# Patient Record
Sex: MALE | Race: WHITE | NOT HISPANIC OR LATINO | Employment: OTHER | URBAN - NONMETROPOLITAN AREA
[De-identification: names, ages, dates, MRNs, and addresses within clinical notes are randomized per-mention and may not be internally consistent; named-entity substitution may affect disease eponyms.]

---

## 2018-02-02 ENCOUNTER — OFFICE VISIT CONVERTED (OUTPATIENT)
Dept: FAMILY MEDICINE CLINIC | Age: 68
End: 2018-02-02
Attending: FAMILY MEDICINE

## 2018-08-01 ENCOUNTER — OFFICE VISIT CONVERTED (OUTPATIENT)
Dept: FAMILY MEDICINE CLINIC | Age: 68
End: 2018-08-01
Attending: FAMILY MEDICINE

## 2018-10-15 ENCOUNTER — OFFICE VISIT CONVERTED (OUTPATIENT)
Dept: FAMILY MEDICINE CLINIC | Age: 68
End: 2018-10-15
Attending: NURSE PRACTITIONER

## 2019-02-26 ENCOUNTER — OFFICE VISIT CONVERTED (OUTPATIENT)
Dept: FAMILY MEDICINE CLINIC | Age: 69
End: 2019-02-26
Attending: FAMILY MEDICINE

## 2019-02-26 ENCOUNTER — HOSPITAL ENCOUNTER (OUTPATIENT)
Dept: OTHER | Facility: HOSPITAL | Age: 69
Discharge: HOME | End: 2019-02-26
Attending: FAMILY MEDICINE

## 2019-02-26 LAB
ALBUMIN SERPL-MCNC: 4.2 G/DL (ref 3.5–5)
ALBUMIN/GLOB SERPL: 1.5 {RATIO} (ref 1.4–2.6)
ALP SERPL-CCNC: 65 U/L (ref 56–155)
ALT SERPL-CCNC: 26 U/L (ref 10–40)
ANION GAP SERPL CALC-SCNC: 20 MMOL/L (ref 8–19)
AST SERPL-CCNC: 17 U/L (ref 15–50)
BASOPHILS # BLD MANUAL: 0.05 10*3/UL (ref 0–0.2)
BASOPHILS NFR BLD MANUAL: 0.7 % (ref 0–3)
BILIRUB SERPL-MCNC: 0.3 MG/DL (ref 0.2–1.3)
BUN SERPL-MCNC: 18 MG/DL (ref 5–25)
BUN/CREAT SERPL: 16 {RATIO} (ref 6–20)
CALCIUM SERPL-MCNC: 8.7 MG/DL (ref 8.7–10.4)
CHLORIDE SERPL-SCNC: 105 MMOL/L (ref 99–111)
CHOLEST SERPL-MCNC: 183 MG/DL (ref 107–200)
CHOLEST/HDLC SERPL: 3.2 {RATIO} (ref 3–6)
CONV CO2: 25 MMOL/L (ref 22–32)
CONV TOTAL PROTEIN: 7 G/DL (ref 6.3–8.2)
CREAT UR-MCNC: 1.15 MG/DL (ref 0.7–1.2)
DEPRECATED RDW RBC AUTO: 49 FL
EOSINOPHIL # BLD MANUAL: 0.46 10*3/UL (ref 0–0.7)
EOSINOPHIL NFR BLD MANUAL: 6.4 % (ref 0–7)
ERYTHROCYTE [DISTWIDTH] IN BLOOD BY AUTOMATED COUNT: 13.2 % (ref 11.5–14.5)
GFR SERPLBLD BASED ON 1.73 SQ M-ARVRAT: >60 ML/MIN/{1.73_M2}
GLOBULIN UR ELPH-MCNC: 2.8 G/DL (ref 2–3.5)
GLUCOSE SERPL-MCNC: 115 MG/DL (ref 70–99)
GRANS (ABSOLUTE): 4.02 10*3/UL (ref 2–8)
GRANS: 56.3 % (ref 30–85)
HBA1C MFR BLD: 12.6 G/DL (ref 14–18)
HCT VFR BLD AUTO: 39.2 % (ref 42–52)
HDLC SERPL-MCNC: 57 MG/DL (ref 40–60)
IMM GRANULOCYTES # BLD: 0.03 10*3/UL (ref 0–0.54)
IMM GRANULOCYTES NFR BLD: 0.4 % (ref 0–0.43)
LDLC SERPL CALC-MCNC: 110 MG/DL (ref 70–100)
LYMPHOCYTES # BLD MANUAL: 1.89 10*3/UL (ref 1–5)
LYMPHOCYTES NFR BLD MANUAL: 9.8 % (ref 3–10)
MCH RBC QN AUTO: 32.3 PG (ref 27–31)
MCHC RBC AUTO-ENTMCNC: 32.1 G/DL (ref 33–37)
MCV RBC AUTO: 100.5 FL (ref 80–96)
MONOCYTES # BLD AUTO: 0.7 10*3/UL (ref 0.2–1.2)
OSMOLALITY SERPL CALC.SUM OF ELEC: 303 MOSM/KG (ref 273–304)
PLATELET # BLD AUTO: 226 10*3/UL (ref 130–400)
PMV BLD AUTO: 9.1 FL (ref 7.4–10.4)
POTASSIUM SERPL-SCNC: 5.3 MMOL/L (ref 3.5–5.3)
PSA SERPL-MCNC: 0.6 NG/ML (ref 0–4)
RBC # BLD AUTO: 3.9 10*6/UL (ref 4.7–6.1)
SODIUM SERPL-SCNC: 145 MMOL/L (ref 135–147)
TRIGL SERPL-MCNC: 80 MG/DL (ref 40–150)
TSH SERPL-ACNC: 1.83 M[IU]/L (ref 0.27–4.2)
VARIANT LYMPHS NFR BLD MANUAL: 26.4 % (ref 20–45)
VLDLC SERPL-MCNC: 16 MG/DL (ref 5–37)
WBC # BLD AUTO: 7.15 10*3/UL (ref 4.8–10.8)

## 2019-08-08 ENCOUNTER — HOSPITAL ENCOUNTER (OUTPATIENT)
Dept: SURGERY | Facility: HOSPITAL | Age: 69
Setting detail: HOSPITAL OUTPATIENT SURGERY
Discharge: HOME | End: 2019-08-08
Attending: OPHTHALMOLOGY

## 2019-08-22 ENCOUNTER — HOSPITAL ENCOUNTER (OUTPATIENT)
Dept: SURGERY | Facility: HOSPITAL | Age: 69
Setting detail: HOSPITAL OUTPATIENT SURGERY
Discharge: HOME | End: 2019-08-22
Attending: OPHTHALMOLOGY

## 2019-09-04 ENCOUNTER — OFFICE VISIT CONVERTED (OUTPATIENT)
Dept: FAMILY MEDICINE CLINIC | Age: 69
End: 2019-09-04
Attending: FAMILY MEDICINE

## 2019-10-22 ENCOUNTER — OFFICE VISIT CONVERTED (OUTPATIENT)
Dept: FAMILY MEDICINE CLINIC | Age: 69
End: 2019-10-22
Attending: FAMILY MEDICINE

## 2019-12-23 ENCOUNTER — OFFICE VISIT CONVERTED (OUTPATIENT)
Dept: FAMILY MEDICINE CLINIC | Age: 69
End: 2019-12-23
Attending: FAMILY MEDICINE

## 2020-01-29 ENCOUNTER — OFFICE VISIT CONVERTED (OUTPATIENT)
Dept: FAMILY MEDICINE CLINIC | Age: 70
End: 2020-01-29
Attending: FAMILY MEDICINE

## 2020-03-09 ENCOUNTER — OFFICE VISIT CONVERTED (OUTPATIENT)
Dept: FAMILY MEDICINE CLINIC | Age: 70
End: 2020-03-09
Attending: FAMILY MEDICINE

## 2020-03-23 ENCOUNTER — OFFICE VISIT CONVERTED (OUTPATIENT)
Dept: FAMILY MEDICINE CLINIC | Age: 70
End: 2020-03-23
Attending: FAMILY MEDICINE

## 2020-04-22 ENCOUNTER — HOSPITAL ENCOUNTER (OUTPATIENT)
Dept: OTHER | Facility: HOSPITAL | Age: 70
Discharge: HOME | End: 2020-04-22

## 2020-04-22 LAB
25(OH)D3 SERPL-MCNC: 18.9 NG/ML (ref 30–100)
ALBUMIN SERPL-MCNC: 3.5 G/DL (ref 3.5–5)
ALBUMIN/GLOB SERPL: 1 {RATIO} (ref 1.4–2.6)
ALP SERPL-CCNC: 80 U/L (ref 56–155)
ALT SERPL-CCNC: 15 U/L (ref 10–40)
ANION GAP SERPL CALC-SCNC: 21 MMOL/L (ref 8–19)
AST SERPL-CCNC: 16 U/L (ref 15–50)
BASOPHILS # BLD AUTO: 0.04 10*3/UL (ref 0–0.2)
BASOPHILS NFR BLD AUTO: 0.3 % (ref 0–3)
BILIRUB SERPL-MCNC: 0.65 MG/DL (ref 0.2–1.3)
BUN SERPL-MCNC: 21 MG/DL (ref 5–25)
BUN/CREAT SERPL: 26 {RATIO} (ref 6–20)
CALCIUM SERPL-MCNC: 9.6 MG/DL (ref 8.7–10.4)
CHLORIDE SERPL-SCNC: 91 MMOL/L (ref 99–111)
CONV ABS IMM GRAN: 0.06 10*3/UL (ref 0–0.2)
CONV CO2: 23 MMOL/L (ref 22–32)
CONV IMMATURE GRAN: 0.5 % (ref 0–1.8)
CONV TOTAL PROTEIN: 6.9 G/DL (ref 6.3–8.2)
CREAT UR-MCNC: 0.82 MG/DL (ref 0.7–1.2)
DEPRECATED RDW RBC AUTO: 47 FL (ref 35.1–43.9)
EOSINOPHIL # BLD AUTO: 0.17 10*3/UL (ref 0–0.7)
EOSINOPHIL # BLD AUTO: 1.5 % (ref 0–7)
ERYTHROCYTE [DISTWIDTH] IN BLOOD BY AUTOMATED COUNT: 13.2 % (ref 11.6–14.4)
GFR SERPLBLD BASED ON 1.73 SQ M-ARVRAT: >60 ML/MIN/{1.73_M2}
GLOBULIN UR ELPH-MCNC: 3.4 G/DL (ref 2–3.5)
GLUCOSE SERPL-MCNC: 113 MG/DL (ref 70–99)
HCT VFR BLD AUTO: 35 % (ref 42–52)
HGB BLD-MCNC: 11.4 G/DL (ref 14–18)
LYMPHOCYTES # BLD AUTO: 1.36 10*3/UL (ref 1–5)
LYMPHOCYTES NFR BLD AUTO: 11.9 % (ref 20–45)
MCH RBC QN AUTO: 31.5 PG (ref 27–31)
MCHC RBC AUTO-ENTMCNC: 32.6 G/DL (ref 33–37)
MCV RBC AUTO: 96.7 FL (ref 80–96)
MONOCYTES # BLD AUTO: 1.83 10*3/UL (ref 0.2–1.2)
MONOCYTES NFR BLD AUTO: 16 % (ref 3–10)
NEUTROPHILS # BLD AUTO: 7.99 10*3/UL (ref 2–8)
NEUTROPHILS NFR BLD AUTO: 69.8 % (ref 30–85)
NRBC CBCN: 0 % (ref 0–0.7)
OSMOLALITY SERPL CALC.SUM OF ELEC: 276 MOSM/KG (ref 273–304)
PLATELET # BLD AUTO: 343 10*3/UL (ref 130–400)
PMV BLD AUTO: 10.4 FL (ref 9.4–12.4)
POTASSIUM SERPL-SCNC: 4.3 MMOL/L (ref 3.5–5.3)
RBC # BLD AUTO: 3.62 10*6/UL (ref 4.7–6.1)
SODIUM SERPL-SCNC: 131 MMOL/L (ref 135–147)
WBC # BLD AUTO: 11.45 10*3/UL (ref 4.8–10.8)

## 2020-04-27 ENCOUNTER — HOSPITAL ENCOUNTER (OUTPATIENT)
Dept: OTHER | Facility: HOSPITAL | Age: 70
Discharge: HOME | End: 2020-04-27

## 2020-05-04 ENCOUNTER — HOSPITAL ENCOUNTER (OUTPATIENT)
Dept: OTHER | Facility: HOSPITAL | Age: 70
Discharge: HOME | End: 2020-05-04

## 2020-05-05 ENCOUNTER — HOSPITAL ENCOUNTER (OUTPATIENT)
Dept: OTHER | Facility: HOSPITAL | Age: 70
Discharge: HOME | End: 2020-05-05

## 2020-05-05 LAB — BACTERIA UR CULT: NORMAL

## 2020-05-11 ENCOUNTER — HOSPITAL ENCOUNTER (OUTPATIENT)
Dept: OTHER | Facility: HOSPITAL | Age: 70
Discharge: HOME | End: 2020-05-11

## 2020-05-11 LAB
BASOPHILS # BLD AUTO: 0.08 10*3/UL (ref 0–0.2)
BASOPHILS NFR BLD AUTO: 1.1 % (ref 0–3)
CONV ABS IMM GRAN: 0.01 10*3/UL (ref 0–0.2)
CONV IMMATURE GRAN: 0.1 % (ref 0–1.8)
DEPRECATED RDW RBC AUTO: 46.4 FL (ref 35.1–43.9)
EOSINOPHIL # BLD AUTO: 0.49 10*3/UL (ref 0–0.7)
EOSINOPHIL # BLD AUTO: 6.4 % (ref 0–7)
ERYTHROCYTE [DISTWIDTH] IN BLOOD BY AUTOMATED COUNT: 13 % (ref 11.6–14.4)
HCT VFR BLD AUTO: 37.5 % (ref 42–52)
HGB BLD-MCNC: 11.9 G/DL (ref 14–18)
LYMPHOCYTES # BLD AUTO: 2.53 10*3/UL (ref 1–5)
LYMPHOCYTES NFR BLD AUTO: 33.3 % (ref 20–45)
MCH RBC QN AUTO: 30.6 PG (ref 27–31)
MCHC RBC AUTO-ENTMCNC: 31.7 G/DL (ref 33–37)
MCV RBC AUTO: 96.4 FL (ref 80–96)
MONOCYTES # BLD AUTO: 0.76 10*3/UL (ref 0.2–1.2)
MONOCYTES NFR BLD AUTO: 10 % (ref 3–10)
NEUTROPHILS # BLD AUTO: 3.73 10*3/UL (ref 2–8)
NEUTROPHILS NFR BLD AUTO: 49.1 % (ref 30–85)
NRBC CBCN: 0 % (ref 0–0.7)
PLATELET # BLD AUTO: 281 10*3/UL (ref 130–400)
PMV BLD AUTO: 10 FL (ref 9.4–12.4)
RBC # BLD AUTO: 3.89 10*6/UL (ref 4.7–6.1)
WBC # BLD AUTO: 7.6 10*3/UL (ref 4.8–10.8)

## 2020-05-26 ENCOUNTER — OFFICE VISIT CONVERTED (OUTPATIENT)
Dept: FAMILY MEDICINE CLINIC | Age: 70
End: 2020-05-26
Attending: FAMILY MEDICINE

## 2020-08-24 ENCOUNTER — OFFICE VISIT CONVERTED (OUTPATIENT)
Dept: FAMILY MEDICINE CLINIC | Age: 70
End: 2020-08-24
Attending: FAMILY MEDICINE

## 2020-11-06 ENCOUNTER — HOSPITAL ENCOUNTER (OUTPATIENT)
Dept: OTHER | Facility: HOSPITAL | Age: 70
Discharge: HOME | End: 2020-11-06
Attending: FAMILY MEDICINE

## 2020-11-08 LAB — SARS-COV-2 RNA SPEC QL NAA+PROBE: NOT DETECTED

## 2020-11-30 ENCOUNTER — HOSPITAL ENCOUNTER (OUTPATIENT)
Dept: OTHER | Facility: HOSPITAL | Age: 70
Discharge: HOME | End: 2020-11-30
Attending: FAMILY MEDICINE

## 2020-11-30 ENCOUNTER — OFFICE VISIT CONVERTED (OUTPATIENT)
Dept: FAMILY MEDICINE CLINIC | Age: 70
End: 2020-11-30
Attending: FAMILY MEDICINE

## 2020-11-30 LAB
ALBUMIN SERPL-MCNC: 4.5 G/DL (ref 3.5–5)
ALBUMIN/GLOB SERPL: 1.7 {RATIO} (ref 1.4–2.6)
ALP SERPL-CCNC: 84 U/L (ref 56–155)
ALT SERPL-CCNC: 37 U/L (ref 10–40)
ANION GAP SERPL CALC-SCNC: 19 MMOL/L (ref 8–19)
AST SERPL-CCNC: 27 U/L (ref 15–50)
BILIRUB SERPL-MCNC: 0.44 MG/DL (ref 0.2–1.3)
BUN SERPL-MCNC: 27 MG/DL (ref 5–25)
BUN/CREAT SERPL: 17 {RATIO} (ref 6–20)
CALCIUM SERPL-MCNC: 9.4 MG/DL (ref 8.7–10.4)
CHLORIDE SERPL-SCNC: 98 MMOL/L (ref 99–111)
CHOLEST SERPL-MCNC: 236 MG/DL (ref 107–200)
CHOLEST/HDLC SERPL: 3.4 {RATIO} (ref 3–6)
CONV CO2: 27 MMOL/L (ref 22–32)
CONV TOTAL PROTEIN: 7.2 G/DL (ref 6.3–8.2)
CREAT UR-MCNC: 1.6 MG/DL (ref 0.7–1.2)
ERYTHROCYTE [DISTWIDTH] IN BLOOD BY AUTOMATED COUNT: 13 % (ref 11.5–14.5)
GFR SERPLBLD BASED ON 1.73 SQ M-ARVRAT: 43 ML/MIN/{1.73_M2}
GLOBULIN UR ELPH-MCNC: 2.7 G/DL (ref 2–3.5)
GLUCOSE SERPL-MCNC: 116 MG/DL (ref 70–99)
HBA1C MFR BLD: 14.3 G/DL (ref 14–18)
HCT VFR BLD AUTO: 44.7 % (ref 42–52)
HDLC SERPL-MCNC: 69 MG/DL (ref 40–60)
LDLC SERPL CALC-MCNC: 138 MG/DL (ref 70–100)
MCH RBC QN AUTO: 32.4 PG (ref 27–31)
MCHC RBC AUTO-ENTMCNC: 32 G/DL (ref 33–37)
MCV RBC AUTO: 101.1 FL (ref 80–96)
OSMOLALITY SERPL CALC.SUM OF ELEC: 294 MOSM/KG (ref 273–304)
PLATELET # BLD AUTO: 239 10*3/UL (ref 130–400)
PMV BLD AUTO: 8.8 FL (ref 7.4–10.4)
POTASSIUM SERPL-SCNC: 5.4 MMOL/L (ref 3.5–5.3)
PSA SERPL-MCNC: 0.65 NG/ML (ref 0–4)
RBC # BLD AUTO: 4.42 10*6/UL (ref 4.7–6.1)
SODIUM SERPL-SCNC: 139 MMOL/L (ref 135–147)
TRIGL SERPL-MCNC: 143 MG/DL (ref 40–150)
TSH SERPL-ACNC: 2.01 M[IU]/L (ref 0.27–4.2)
VLDLC SERPL-MCNC: 29 MG/DL (ref 5–37)
WBC # BLD AUTO: 7.5 10*3/UL (ref 4.8–10.8)

## 2021-02-04 ENCOUNTER — OFFICE VISIT CONVERTED (OUTPATIENT)
Dept: FAMILY MEDICINE CLINIC | Age: 71
End: 2021-02-04
Attending: FAMILY MEDICINE

## 2021-02-04 ENCOUNTER — HOSPITAL ENCOUNTER (OUTPATIENT)
Dept: OTHER | Facility: HOSPITAL | Age: 71
Discharge: HOME | End: 2021-02-04
Attending: INTERNAL MEDICINE

## 2021-02-04 LAB
ALBUMIN SERPL-MCNC: 4.4 G/DL (ref 3.5–5)
ALBUMIN/GLOB SERPL: 1.5 {RATIO} (ref 1.4–2.6)
ALP SERPL-CCNC: 78 U/L (ref 56–155)
ALT SERPL-CCNC: 46 U/L (ref 10–40)
ANION GAP SERPL CALC-SCNC: 16 MMOL/L (ref 8–19)
AST SERPL-CCNC: 32 U/L (ref 15–50)
BILIRUB SERPL-MCNC: 0.49 MG/DL (ref 0.2–1.3)
BUN SERPL-MCNC: 19 MG/DL (ref 5–25)
BUN/CREAT SERPL: 17 {RATIO} (ref 6–20)
CALCIUM SERPL-MCNC: 9.6 MG/DL (ref 8.7–10.4)
CHLORIDE SERPL-SCNC: 94 MMOL/L (ref 99–111)
CHOLEST SERPL-MCNC: 189 MG/DL (ref 107–200)
CHOLEST/HDLC SERPL: 3 {RATIO} (ref 3–6)
CONV CO2: 30 MMOL/L (ref 22–32)
CONV TOTAL PROTEIN: 7.4 G/DL (ref 6.3–8.2)
CREAT UR-MCNC: 1.15 MG/DL (ref 0.7–1.2)
GFR SERPLBLD BASED ON 1.73 SQ M-ARVRAT: >60 ML/MIN/{1.73_M2}
GLOBULIN UR ELPH-MCNC: 3 G/DL (ref 2–3.5)
GLUCOSE SERPL-MCNC: 134 MG/DL (ref 70–99)
HDLC SERPL-MCNC: 62 MG/DL (ref 40–60)
LDLC SERPL CALC-MCNC: 110 MG/DL (ref 70–100)
OSMOLALITY SERPL CALC.SUM OF ELEC: 282 MOSM/KG (ref 273–304)
POTASSIUM SERPL-SCNC: 5.6 MMOL/L (ref 3.5–5.3)
SODIUM SERPL-SCNC: 134 MMOL/L (ref 135–147)
T4 FREE SERPL-MCNC: 1.4 NG/DL (ref 0.9–1.8)
TRIGL SERPL-MCNC: 85 MG/DL (ref 40–150)
TSH SERPL-ACNC: 1.1 M[IU]/L (ref 0.27–4.2)
VLDLC SERPL-MCNC: 17 MG/DL (ref 5–37)

## 2021-04-28 ENCOUNTER — HOSPITAL ENCOUNTER (OUTPATIENT)
Dept: OTHER | Facility: HOSPITAL | Age: 71
Discharge: HOME | End: 2021-04-28
Attending: FAMILY MEDICINE

## 2021-04-29 LAB
ANION GAP SERPL CALC-SCNC: 14 MMOL/L (ref 8–19)
BUN SERPL-MCNC: 25 MG/DL (ref 5–25)
BUN/CREAT SERPL: 18 {RATIO} (ref 6–20)
CALCIUM SERPL-MCNC: 9.2 MG/DL (ref 8.7–10.4)
CHLORIDE SERPL-SCNC: 94 MMOL/L (ref 99–111)
CONV CO2: 29 MMOL/L (ref 22–32)
CREAT UR-MCNC: 1.38 MG/DL (ref 0.7–1.2)
GFR SERPLBLD BASED ON 1.73 SQ M-ARVRAT: 52 ML/MIN/{1.73_M2}
GLUCOSE SERPL-MCNC: 109 MG/DL (ref 70–99)
OSMOLALITY SERPL CALC.SUM OF ELEC: 277 MOSM/KG (ref 273–304)
POTASSIUM SERPL-SCNC: 5.6 MMOL/L (ref 3.5–5.3)
SODIUM SERPL-SCNC: 131 MMOL/L (ref 135–147)

## 2021-05-18 NOTE — PROGRESS NOTES
Xavier Polanco PATRICIA  1950     Office/Outpatient Visit    Visit Date: Mon, Aug 24, 2020 01:44 pm    Provider: Esvin Gilliam MD (Assistant: Marti Saleh RN)    Location: [facility]        Electronically signed by Esvin Gililam MD on  08/24/2020 05:29:13 PM                             Subjective:        CC: Francisco is a 69 year old White male.  This is a follow-up visit.      MCW;         HPI:           Francisco is here for a Medicare wellness visit.  The required HRA questions are integrated within this visit note. Family medical history and individual medical/surgical history were reviewed and updated.  A current height, weight, BMI, blood pressure, and pulse were recorded in the vitals section of the note and have been reviewed. Patient's medications, including supplements, were recorded in the chart and reviewed.          Self-Assessment of Health: He rates his health as fair. He rates his confidence of being able to control/manage most of his health problems as very confident. His physical/emotional health has limited his social activites moderately.  A review of possible cognitive impairment was performed and the following was noted: Memory Impairment Screen is normal.  A review of functional ability, including bathing, dressing, walking, and urine/bowel continence as well as level of safety was performed and was found to be negative.  Falls Risk: Has not had any falls or only one fall without injury in the past year.  He denies having trouble hearing the TV/radio when others do not, having to strain to hear or understand conversations and wearing hearing aid(s).  Concerning home safety, he reports that at home he DOES have adequate lighting, a skid resistant shower/tub, grab bars in the bath, handrails on stairs, functioning smoke alarms and absence of throw rugs.          Immunization Status: ** >10 years since last Td booster; Age>60, no shingles vaccination; Physical  Activity: He exercises but less than 20 minutes 3 days per week; Type of diet patient normally eats is described as poor--needs improvement.  Tobacco: He has a past history of cigarette smoking; quit date:  Quit Date: 2017.   Preventative Health updated today.            PHQ-9 Depression Screening: Completed form scanned and in chart; Total Score enter total score  :23     ROS:     CONSTITUTIONAL:  Negative for chills and fever.      EYES:  Negative for blurred vision and eye drainage.      E/N/T:  Negative for ear pain, nasal congestion and sore throat.      CARDIOVASCULAR:  Negative for chest pain, palpitations and pedal edema.      RESPIRATORY:  Negative for recent cough and dyspnea.      GASTROINTESTINAL:  Negative for abdominal pain, anorexia, constipation, diarrhea, nausea and vomiting.      GENITOURINARY:  Positive for nocturia (TWICE A NIGHT).   Negative for dysuria, hematuria, urinary incontinence or change in urine stream.      NEUROLOGICAL:  Negative for dizziness and headaches.      PSYCHIATRIC:  Positive for depression ( (STATES MEDS ARE WORKING WELL) ).   Negative for suicidal thoughts.          Past Medical History / Family History / Social History:         Last Reviewed on 2020 01:56 PM by Esvin Gilliam    Past Medical History:             PREVENTIVE HEALTH MAINTENANCE             EVALUATION FOR AORTIC ANEURYSM: was last done  with normal results     COLORECTAL CANCER SCREENING: Up to date (colonoscopy q10y; sigmoidoscopy q5y; Cologuard q3y) was last done , Results are in chart; colonoscopy with normal results;      DENTAL CLEANIN19     EYE EXAM: 19     Hepatitis C Medicare Screening: was last done      PSA: was last done 2019 with normal results         PAST MEDICAL HISTORY             CURRENT MEDICAL PROVIDERS:    Cardiologist    Pulmonologist             ADVANCE DIRECTIVES: Living will         Surgical History:         Arthroscopy: left shoulder;      Cataract Removal: bilateral;     Fracture Repair: L/R ANKLE. PELVIS, L CLAVICLE, L HUMERUS;;;     Laminectomy: cervical region;         Family History:     Father: Cause of death was cancer     Mother:      Brother(s): 5 brother(s) total; 1     ; Positive for Skin Cancer ( Melanoma );     Sister(s): 4 sister(s) total     Son(s): 1 son(s) total     Daughter(s): 5 daughter(s) total         Social History:     Occupation:    Retired         Tobacco/Alcohol/Supplements:     Last Reviewed on 2020 01:56 PM by Esvin Gilliam    Tobacco: He has a past history of cigar smoking. Quit Date:  17         Alcohol: Frequency: Daily When he drinks, the average quantity of alcohol is 3 drinks.   He typically consumes beer.      Caffeine:  He admits to consuming caffeine via coffee ( 1 serving per day ).          Substance Abuse History:     Last Reviewed on 2020 01:56 PM by Esvin Gilliam    NEGATIVE         Mental Health History:     Last Reviewed on 2020 07:54 PM by Panda Nicole        Communicable Diseases (eg STDs):     Last Reviewed on 2020 07:54 PM by Panda Nicole        Current Problems:     Last Reviewed on 2020 01:56 PM by Esvin Gilliam    Pure hypercholesterolemia (mild)    Primary generalized (osteo)arthritis    Anxiety disorder, unspecified    Other long term (current) drug therapy    Diverticulosis    Allergies    Obstructive sleep apnea (cannot tolerate CPAP)    Paroxysmal atrial fibrillation    Unspecified asthma, uncomplicated    Encounter for general adult medical examination without abnormal findings    Encounter for screening for depression        Immunizations:     Prevnar 13 (Pneumococcal PCV 13) 2018    Fluzone Quadrivalent (3+ years) 10/21/2016    Fluzone High-Dose pf (>=65 yr) 10/31/2017    Fluzone High-Dose pf (>=65 yr) 2018    Fluzone High-Dose pf (>=65 yr) 10/7/2019    PNEUMOVAX 23 (Pneumococcal PPV23) 2019         Allergies:     Last Reviewed on 8/24/2020 01:56 PM by Esvin Gilliam    No Known Allergies.        Current Medications:     Last Reviewed on 8/24/2020 01:56 PM by Esvin Gilliam    aspirin 81 mg oral tablet, delayed release (enteric coated) [take 1 tablet (81 mg) by oral route once daily]    multivitamin     Tramadol 50 mg oral tablet [1 tab bid. prn]    busPIRone 10 mg oral tablet [TAKE ONE TABLET BY MOUTH TWICE A DAY AS NEEDED]    Diclofenac Sodium 75 mg oral tablet, delayed release (enteric coated) [1 tab bid]    amiodarone 200 mg oral tablet [1 tab daily ]    PARoxetine HCl 10 mg oral tablet [Take 1 tablet(s) by mouth daily]    cyclobenzaprine 10 mg oral tablet [take 1 tablet (10 mg) by oral route 3 times per day as needed]        Objective:        Vitals:         Current: 8/24/2020 1:45:59 PM    Ht:  5 ft, 5 in;  Wt: 221.6 lbs;  BMI: 36.9T: 98 F (oral);  BP: 111/37 mm Hg (left arm, sitting);  P: 62 bpm (left arm (BP Cuff), sitting);  sCr: 1.15 mg/dL;  GFR: 61.90VA: 20/50 OD, 20/50 OS (near, without correction)        Repeat:     2:0:30 PM  BP:   127/62mm Hg (right arm, lying right) 2:0:41 PM  P:   59bpm (left arm (BP Cuff), sitting)     Exams:     PHYSICAL EXAM:     GENERAL: Vitals recorded well developed, well nourished;  no apparent distress;     EYES: conjunctiva and cornea are normal;     E/N/T:  normal EACs, TMs, nasal/oral mucosa, teeth, gingiva, and oropharynx;     NECK: trachea is midline; thyroid is non-palpable; carotid exam reveals no bruits;     RESPIRATORY: normal respiratory rate and pattern with no distress; normal breath sounds with no rales, rhonchi, wheezes or rubs;     CARDIOVASCULAR: normal rate; rhythm is regular;  no systolic murmur;    Peripheral Pulses: dorsalis pedis: 2+ L and 2+ R;  no edema;     GASTROINTESTINAL: nontender;     LYMPHATIC: no enlargement of cervical or facial nodes;     MUSCULOSKELETAL: normal gait; normal overall tone     NEUROLOGIC: GROSSLT INTACT;      PSYCHIATRIC:  appropriate affect and demeanor; normal speech pattern; grossly normal memory;         Lab/Test Results:         Urine temperature: confirmed (08/24/2020),     All urine drug screen levels confirmed negative: yes (08/24/2020),     Date and time of last pill: tramadol 8/24/20 10 am/and (08/24/2020),     Performed by: tls (08/24/2020),     Collection Time: 1455 (08/24/2020),             Assessment:         Z00.00   Encounter for general adult medical examination without abnormal findings       Z79.899   Other long term (current) drug therapy       Z13.31   Encounter for screening for depression           ORDERS:         Radiology/Test Orders:       3017F  Colorectal CA screen results documented and reviewed (PV)  (In-House)              Lab Orders:       83240  Drug test prsmv qual dir optical obs per day  (In-House)              Procedures Ordered:         Annual wellness visit, includes a PPPS, subsequent visit  (In-House)              Other Orders:         Depression screen positive and follow up plan documented  (In-House)            1101F  Pt screen for fall risk; document no falls in past year or only 1 fall w/o injury in past year (LOGAN)  (In-House)                      Plan:         Encounter for general adult medical examination without abnormal findings        COUNSELING was provided today regarding the following topics: healthy eating habits, regular exercise, use of seat belts, fall prevention, Medicare Preventive Service Guide given to patient., ADVISED TO SEE AN EYE DOCTOR AND A DENTIST REGULARLY, and Given Home Safety Handout.      FOLLOW-UP: Schedule a follow-up visit in 3 months.      RECOMMENDATIONS given include: need for yearly flu shots.  MIPS Positive Depression Screen: Suicide Risk Assessment completed--denies suicidal/homicidal ideation; Stable on medications. No suicidal ideation.            Orders:         Depression screen positive and follow up plan  documented  (In-House)            1101F  Pt screen for fall risk; document no falls in past year or only 1 fall w/o injury in past year (LOGAN)  (In-House)            3017F  Colorectal CA screen results documented and reviewed (PV)  (In-House)              Annual wellness visit, includes a PPPS, subsequent visit  (In-House)              Other long term (current) drug therapy    LABORATORY:  Labs ordered to be performed today include Drug screen.            Orders:       [id number]  Drug test prsmv qual dir optical obs per day  (In-House)                  Patient Recommendations:        For  Encounter for general adult medical examination without abnormal findings:    Limit dietary intake of fat (especially saturated fat) and cholesterol.  Eat a variety of foods, including plenty of fruits, vegetables, and grain containg fiber, limit fat intake to 30% of total calories. Balance caloric intake with energy expended. Maintaining regular physical activity is advised to help prevent heart disease, hypertension, diabetes, and obesity. Always use shoulder/lap restraints when driving or riding in a vehicle, even those equipped with air bags. Regularly exercise within recommended guidelines, especially to maintain balance. Remove obstacles in walkways at home.  Use non-skid material for bathtub safety.  Schedule a follow-up visit in 3 months.  Obtain a flu shot each year.              Charge Capture:         Primary Diagnosis:     Z00.00  Encounter for general adult medical examination without abnormal findings           Orders:      [id number]  Preventive medicine, established patient, age 65+ years  (In-House)              Depression screen positive and follow up plan documented  (In-House)            1101F  Pt screen for fall risk; document no falls in past year or only 1 fall w/o injury in past year (LOGAN)  (In-House)            3017F  Colorectal CA screen results documented and reviewed (PV)  (In-House)               Annual wellness visit, includes a PPPS, subsequent visit  (In-House)              Z79.899  Other long term (current) drug therapy           Orders:      32011  Drug test prsmv qual dir optical obs per day  (In-House)              Z13.31  Encounter for screening for depression

## 2021-05-18 NOTE — PROGRESS NOTES
"Xavier Polanco PATRICIA  1950     Office/Outpatient Visit    Visit Date: Wed, Jan 29, 2020 01:20 pm    Provider: Panda Nicole MD (Assistant: Selena Gusman MA)    Location: [facility]        Electronically signed by Panda Nicole MD on  01/29/2020 07:54:28 PM                             Subjective:        CC: Francisco is a 69 year old White male.  presents today due to stiff hands         HPI: Patient c/o an episode approx 1 week ago where he experienced bilateral numb hands, weak legs, and lightheadedness. He denies confusion or syncope during the episode. It lasted for \"a couple minutes.\" He had been attempting to blow into his car's breathalyzer, which he struggles with at baseline, and after he was unsuccessful, he stood up and that's when the symptoms appeared.          PHQ-9 Depression Screening: Completed form scanned and in chart; Total Score 0     ROS:     CONSTITUTIONAL:  Negative for chills, fatigue and fever.      EYES:  Negative for blurred vision.      CARDIOVASCULAR:  Positive for lightheadedness.   Negative for chest pain, palpitations or edema.      RESPIRATORY:  Negative for dyspnea and cough.      GASTROINTESTINAL:  Negative for abdominal pain, constipation, diarrhea, heartburn, nausea and vomiting.      NEUROLOGICAL:  Positive for paresthesias and weakness.   Negative for headaches.      PSYCHIATRIC:  Negative for anxiety, depression, feelings of stress and suicidal thoughts.          Past Medical History / Family History / Social History:         Last Reviewed on 1/29/2020 07:54 PM by Panda Nicole    Past Medical History:             PREVENTIVE HEALTH MAINTENANCE             EVALUATION FOR AORTIC ANEURYSM: was last done 2018 with normal results     COLORECTAL CANCER SCREENING: Up to date (colonoscopy q10y; sigmoidoscopy q5y; Cologuard q3y) was last done 2015, Results are in chart; colonoscopy with normal results     DENTAL CLEANING: RECOMMENDED 2/26/19     EYE EXAM: " RECOMMENDED 19     Hepatitis C Medicare Screening: was last done      PSA: was last done  with normal results         PAST MEDICAL HISTORY             CURRENT MEDICAL PROVIDERS:    Cardiologist    Pulmonologist             ADVANCE DIRECTIVES: Living will         Surgical History:         Arthroscopy: left shoulder;     Cataract Removal: bilateral;     Fracture Repair: L/R ANKLE. PELVIS, L CLAVICLE, L HUMERUS;;;         Family History:     Father: Cause of death was cancer     Mother:      Brother(s): 5 brother(s) total; 1     ; Positive for Skin Cancer ( Melanoma );     Sister(s): 4 sister(s) total     Son(s): 1 son(s) total     Daughter(s): 5 daughter(s) total         Social History:     Occupation:    Retired         Tobacco/Alcohol/Supplements:     Last Reviewed on 2020 07:54 PM by Panda Nicole    Tobacco: He has a past history of cigar smoking. Quit Date:  17         Alcohol: Frequency: Daily When he drinks, the average quantity of alcohol is 3 drinks.   He typically consumes beer.      Caffeine:  He admits to consuming caffeine via coffee ( 2 servings per day ).          Substance Abuse History:     Last Reviewed on 2020 07:54 PM by Panda Nicole    NEGATIVE         Mental Health History:     Last Reviewed on 2020 07:54 PM by Panda Nicole        Communicable Diseases (eg STDs):     Last Reviewed on 2020 07:54 PM by Panda Nciole        Current Problems:     Last Reviewed on 2020 07:54 PM by Panda Nicole    Generalized osteoarthritis, multiple sites    Pure hypercholesterolemia    Primary generalized (osteo)arthritis    Elevated cholesterol    Anxiety    Anxiety disorder, unspecified    Other long term (current) drug therapy    Use of high risk medications    Diverticulosis    Allergies    HTN    Essential (primary) hypertension    Paroxysmal atrial fibrillation    Obstructive sleep apnea (adult) (pediatric)    Obstructive sleep apnea (CPAP pending)    " Unspecified asthma, uncomplicated    Encounter for screening for depression        Immunizations:     Prevnar 13 (Pneumococcal PCV 13) 2/2/2018    Fluzone Quadrivalent (3+ years) 10/21/2016    Fluzone High-Dose pf (>=65 yr) 10/31/2017    Fluzone High-Dose pf (>=65 yr) 11/14/2018    Fluzone High-Dose pf (>=65 yr) 10/7/2019    PNEUMOVAX 23 (Pneumococcal PPV23) 2/26/2019        Allergies:     Last Reviewed on 1/29/2020 07:54 PM by Panda Nicole    No Known Allergies.        Current Medications:     Last Reviewed on 1/29/2020 07:54 PM by Panda Nicole    busPIRone 10 mg oral tablet [Take 1 tablet by mouth twice daily as needed]    Diclofenac Sodium 75 mg oral tablet, delayed release (enteric coated) [1 tab bid]    amiodarone 200 mg oral tablet [1 tab daily ]    Valsartan 80 mg oral tablet [Take 1 tablet(s) by mouth daily]    Paroxetine HCl 10 mg oral tablet [Take 1 tablet(s) by mouth daily]    Ventolin HFA 90 mcg/actuation Inhalation HFA Aerosol Inhaler [inhale 1 - 2 puffs (90 - 180 mcg) by inhalation route every 4 hours as needed]    aspirin 81 mg oral tablet, delayed release (enteric coated) [take 1 tablet (81 mg) by oral route once daily]    Tramadol 50 mg oral tablet [1 tab bid. prn]        Objective:        Vitals:         Current: 1/29/2020 1:27:27 PM    Ht:  5 ft, 5 in;  Wt: 236.6 lbs;  BMI: 39.4T: 98.5 F (oral);  BP: 144/70 mm Hg (left arm, sitting);  P: 57 bpm (left arm (BP Cuff), sitting);  sCr: 1.15 mg/dL;  GFR: 63.65        Exams:     PHYSICAL EXAM:     GENERAL: Vitals recorded well developed, well nourished;  no apparent distress;     EYES: conjunctiva and cornea are normal;     RESPIRATORY: Clear to auscultation bilateally; no rales (\"crackles\") present; no rhonchi; no wheezes;     CARDIOVASCULAR: normal rate; rhythm is regular;  No murmurs, clicks, gallops or rubs appreciated; no edema;     SKIN:  No significant rashes, lesions or suspicious moles within limits of examination;     NEUROLOGIC: Grossly " intact; mental status: alert and oriented x 3; cranial nerves II-XII grossly intact; sensation: intact to light touch in all 4 extremities;  reflexes: knee jerks: 2+;     PSYCHIATRIC: appropriate affect and demeanor; normal speech pattern; Normal behavior;         Assessment:         R42   Dizziness and giddiness       R20.2   Paresthesia of skin       Z13.31   Encounter for screening for depression           ORDERS:         Other Orders:         Depression screen negative  (In-House)                      Plan:         Dizziness and giddiness- Unclear etiology. However, working diagnosis is that patient symptoms were brought on by hyperventilation due to repeated attempts to blow into his cars breathalyzer.  He is concerned about TIA or undiagnosed stroke and while we cannot definitively ruled out at this time, It is not felt that we need to do any work-up for this.  If symptoms recur or worsen, will consider further work-up including imaging. ED/return precautions given.        Paresthesia of skin- See above        Encounter for screening for depression    MIPS PHQ-9 Depression Screening: Completed form scanned and in chart; Total Score 0; Negative Depression Screen           Orders:         Depression screen negative  (In-House)                  Charge Capture:         Primary Diagnosis:     R42  Dizziness and giddiness           Orders:      [id number]  Office/outpatient visit; established patient, level 4  (In-House)              R20.2  Paresthesia of skin     Z13.31  Encounter for screening for depression           Orders:        Depression screen negative  (In-House)

## 2021-05-18 NOTE — PROGRESS NOTES
Xavier Polanco 1950     Office/Outpatient Visit    Visit Date: Wed, Sep 4, 2019 03:00 pm    Provider: Esvin Gilliam MD (Assistant: Aide Bradford MA)    Location: [facility]        Electronically signed by Esvin Gilliam MD on  09/05/2019 08:13:50 AM                             SUBJECTIVE:        CC:     Francisco is a 68 year old White male.  Patient presents today for follow up visit         HPI:         Francisco presents with generalized osteoarthritis, multiple sites.  This has been diagnosed as osteoarthritis.  The discomfort is moderately severe.  Primary joints affected include left shoulder, knees, and ankles.  These medications have worked the best: muscle relaxants, NSAIDS ( Voltaren ), and pain medications ( TRAMADOL ) Other details: ALSO STILL HAS PAIN FROM OLD BROKEN RIBS.          Elevated cholesterol details; current treatment includes diet.  Compliance with treatment has been fair.  Most recent lab tests include Total Cholesterol:  183 (mg/dL) (02/26/2019), HDL:  57 (mg/dL) (02/26/2019), Triglycerides:  80 (mg/dL) (02/26/2019), LDL:  110 (mg/dL) (02/26/2019).          HTN details; his current cardiac medication regimen includes an angiotensin receptor blocker.  Compliance with treatment has been good.      ROS:     CONSTITUTIONAL:  Negative for chills and fever.      E/N/T:  Negative for ear pain, nasal congestion and sore throat.      CARDIOVASCULAR:  Negative for chest pain, palpitations and pedal edema.      RESPIRATORY:  Negative for recent cough and dyspnea.      GASTROINTESTINAL:  Negative for abdominal pain, anorexia, nausea and vomiting.      NEUROLOGICAL:  Negative for dizziness and headaches.          PMH/FM/SH:     Last Reviewed on 9/05/2019 08:06 AM by Esvin Gilliam    Past Medical History:             PREVENTIVE HEALTH MAINTENANCE             EVALUATION FOR AORTIC ANEURYSM: was last done 2018 with normal results     COLORECTAL CANCER SCREENING: Up to date  (colonoscopy q10y; sigmoidoscopy q5y; Cologuard q3y) was last done , Results are in chart; colonoscopy with normal results     DENTAL CLEANING: RECOMMENDED 19     EYE EXAM: RECOMMENDED 19     Hepatitis C Medicare Screening: was last done      PSA: was last done  with normal results         PAST MEDICAL HISTORY             CURRENT MEDICAL PROVIDERS:    Cardiologist    Pulmonologist             ADVANCE DIRECTIVES: Living will         Surgical History:         Arthroscopy: left shoulder;     Cataract Removal: bilateral;     Fracture Repair: L/R ANKLE. PELVIS, L CLAVICLE, L HUMERUS;;;         Family History:     Father: Cause of death was cancer     Mother:      Brother(s): 5 brother(s) total; 1     ; Positive for Skin Cancer ( Melanoma );     Sister(s): 4 sister(s) total     Son(s): 1 son(s) total     Daughter(s): 5 daughter(s) total         Social History:     Occupation:    Retired         Tobacco/Alcohol/Supplements:     Last Reviewed on 2019 07:57 AM by Esvin Gilliam    Tobacco: He has a past history of cigar smoking. Quit Date:  17         Alcohol: Frequency: Daily When he drinks, the average quantity of alcohol is 3 drinks.   He typically consumes beer.      Caffeine:  He admits to consuming caffeine via coffee ( 2 servings per day ).          Substance Abuse History:     Last Reviewed on 2019 07:57 AM by Esvin Gilliam    NEGATIVE         Mental Health History:     Last Reviewed on 2019 10:44 AM by Paula Avalos        Communicable Diseases (eg STDs):     Last Reviewed on 2019 10:44 AM by Paula Avalos            Current Problems:     Last Reviewed on 2019 08:06 AM by Esvin Gilliam    Obstructive sleep apnea (CPAP pending)     Paroxysmal atrial fibrillation     HTN     Allergies     Diverticulosis     Use of high risk medications     Anxiety     Generalized osteoarthritis, multiple sites     Elevated  cholesterol         Immunizations:     Prevnar 13 (Pneumococcal PCV 13) 2/2/2018     Fluzone Quadrivalent (3+ years) 10/21/2016     Fluzone High-Dose pf (>=65 yr) 10/31/2017     Fluzone High-Dose pf (>=65 yr) 11/14/2018     PNEUMOVAX 23 (Pneumococcal PPV23) 2/26/2019         Allergies:     Last Reviewed on 9/05/2019 07:57 AM by Esvin Gilliam      No Known Drug Allergies.         Current Medications:     Last Reviewed on 9/05/2019 07:59 AM by Esvin Gilliam    Buspirone HCl 10mg Tablet Take 1 tablet by mouth twice daily as needed     Paroxetine HCl 10mg Tablet Take 1 tablet(s) by mouth daily     Tramadol 50mg Tablet 1 tab bid. prn     Diclofenac Sodium 75mg Tablets, Enteric Coated 1 tab bid     Valsartan 80mg Tablet Take 1 tablet(s) by mouth daily     Amiodarone HCl 200mg Tablet 1 tab daily         OBJECTIVE:        Vitals:         Current: 9/4/2019 3:05:29 PM    Ht:  5 ft, 5 in;  Wt: 233.6 lbs;  BMI: 38.9    T: 98 F (oral);  BP: 137/65 mm Hg (left arm, sitting);  P: 54 bpm (left arm (BP Cuff), sitting);  sCr: 1.15 mg/dL;  GFR: 64.15        Exams:     PHYSICAL EXAM:     GENERAL: Vitals recorded well developed, well nourished;  no apparent distress;     NECK: trachea is midline; thyroid is non-palpable;     RESPIRATORY: normal respiratory rate and pattern with no distress; normal breath sounds with no rales, rhonchi, wheezes or rubs;     CARDIOVASCULAR: normal rate; rhythm is regular;  no systolic murmur; no edema;     GASTROINTESTINAL: nontender;     LYMPHATIC: no enlargement of cervical or facial nodes;     NEUROLOGIC: GROSSLT INTACT;     PSYCHIATRIC:  appropriate affect and demeanor; normal speech pattern; grossly normal memory;         ASSESSMENT           715.09   M15.0  Generalized osteoarthritis, multiple sites              DDx:     272.0   E78.0  Elevated cholesterol              DDx:     401.1   I10  HTN              DDx:         ORDERS:         Meds Prescribed:       Refill of: Tramadol  50mg Tablet 1 tab bid. prn  #60 (Sixty) tablet(s) Refills: 2       Refill of: Diclofenac Sodium 75mg Tablets, Enteric Coated 1 tab bid  #60 (Sixty) tablet(s) Refills: 5         Lab Orders:       APPTO  Appointment need  (In-House)                   PLAN:          Generalized osteoarthritis, multiple sites         RECOMMENDATIONS given include: need for yearly flu shots.      FOLLOW-UP: Schedule a follow-up visit in 6 months..   for Medicare Wellness Visit Controlled substance documentation: Jeremiah reviewed; prior drug screen consistent; consent is reviewed and signed and on the chart.  He is aware of risk of addiction on this medication, understands that he will need to follow up for a review every 3 months and his medications will be adjusted or decreased as deemed appropriate at each visit.  No history of drug or alcohol abuse.  No concerns about diversion or abuse. He denies side effects related to the medication.  He is aware that he may be called in for pill counts.  The dosing of this medication will be reviewed on a regular basis and reduced if possible..  Ongoing use of a controlled substance is necessary for this patient to have a normal quality of life           Prescriptions:       Refill of: Tramadol 50mg Tablet 1 tab bid. prn  #60 (Sixty) tablet(s) Refills: 2       Refill of: Diclofenac Sodium 75mg Tablets, Enteric Coated 1 tab bid  #60 (Sixty) tablet(s) Refills: 5           Orders:       APPTO  Appointment need  (In-House)            Elevated cholesterol         RECOMMENDATIONS given include: low cholesterol/low fat diet.           HTN         MEDICATIONS: (no change to current medication regimen)             Patient Recommendations:        For  Generalized osteoarthritis, multiple sites:     Obtain a flu shot each year.  Schedule a follow-up visit in 6 months.                APPOINTMENT INFORMATION:        Monday Tuesday Wednesday Thursday Friday Saturday Sunday             Time:___________________AM  PM   Date:_____________________         For  Elevated cholesterol:     Reduce the amount of cholesterol and saturated fat in your diet.              CHARGE CAPTURE           **Please note: ICD descriptions below are intended for billing purposes only and may not represent clinical diagnoses**        Primary Diagnosis:         715.09 Generalized osteoarthritis, multiple sites            M15.0    Primary generalized (osteo)arthritis              Orders:          35836   Office/outpatient visit; established patient, level 4  (In-House)             APPTO   Appointment need  (In-House)           272.0 Elevated cholesterol            E78.0    Pure hypercholesterolemia    401.1 HTN            I10    Essential (primary) hypertension
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
## 2021-05-18 NOTE — PROGRESS NOTES
Xavier Polanco  1950     Office/Outpatient Visit    Visit Date: Thu, Feb 4, 2021 09:24 am    Provider: Esvin Gilliam MD (Assistant: Ariel Alexis MA)    Location: [facility]        Electronically signed by Esvin Gilliam MD on  02/04/2021 11:53:09 AM                             Subjective:        CC: Francisco is a 70 year old White male.  This is a follow-up visit.  C/O of low back pain         HPI:           Francisco presents with pure hypercholesterolemia (mild).  Current treatment includes diet.  Compliance with treatment has been fair.  Most recent lab tests include Total Cholesterol:  236 (mg/dL) (11/30/2020), HDL:  69 (mg/dL) (11/30/2020), Triglycerides:  143 (mg/dL) (11/30/2020), LDL:  138 (mg/dL) (11/30/2020).            Essential (primary) hypertension details; this was first diagnosed several years ago.  His current cardiac medication regimen includes an angiotensin receptor blocker.  Compliance with treatment has been good.  He is tolerating the medication well without side effects.  He has not kept a blood pressure diary, but states that pressures have been too high.  ACEI MADE HIM COUGH       (Worsening)     Concerning primary generalized (osteo)arthritis, this has been diagnosed as osteoarthritis.  The discomfort is moderately severe.  Primary joints affected include left shoulder, knees, and ankles.  These medications have worked the best: muscle relaxants, NSAIDS ( Voltaren ), and pain medications ( TRAMADOL ) Other details: ALSO STILL HAS PAIN FROM OLD BROKEN RIBS.  FEELS WORSE SINCE BEING OFF THE NSAIDS.      ROS:     CONSTITUTIONAL:  Negative for chills and fever.      E/N/T:  Negative for ear pain, nasal congestion and sore throat.      CARDIOVASCULAR:  Negative for chest pain, palpitations and pedal edema.      RESPIRATORY:  Negative for recent cough and dyspnea.      GASTROINTESTINAL:  Negative for abdominal pain, anorexia, constipation, diarrhea, nausea and  vomiting.      NEUROLOGICAL:  Negative for dizziness, headaches and RECENT FALLING.          Past Medical History / Family History / Social History:         Last Reviewed on 2021 09:44 AM by Esvin Gilliam    Past Medical History:             PREVENTIVE HEALTH MAINTENANCE             EVALUATION FOR AORTIC ANEURYSM: was last done  with normal results     COLORECTAL CANCER SCREENING: Up to date (colonoscopy q10y; sigmoidoscopy q5y; Cologuard q3y) was last done , Results are in chart; colonoscopy with normal results;      DENTAL CLEANIN19     EYE EXAM: 19     Hepatitis C Medicare Screening: was last done      PSA: was last done 2020 with normal results         PAST MEDICAL HISTORY             CURRENT MEDICAL PROVIDERS:    Cardiologist    Pulmonologist             ADVANCE DIRECTIVES: Living will         Surgical History:         Arthroscopy: left shoulder;     Cataract Removal: bilateral;     Fracture Repair: L/R ANKLE. PELVIS, L CLAVICLE, L HUMERUS;;;     Laminectomy: cervical region;         Family History:     Father: Cause of death was cancer     Mother:      Brother(s): 5 brother(s) total; 1     ; Positive for Skin Cancer ( Melanoma );     Sister(s): 4 sister(s) total     Son(s): 1 son(s) total     Daughter(s): 5 daughter(s) total         Social History:     Occupation:    Retired         Tobacco/Alcohol/Supplements:     Last Reviewed on 2021 09:44 AM by Esvin Gilliam    Tobacco: He has a past history of cigar smoking. Quit Date:  17         Alcohol: Frequency: Daily When he drinks, the average quantity of alcohol is 3 drinks.   He typically consumes beer.      Caffeine:  He admits to consuming caffeine via coffee ( 1 serving per day ).          Substance Abuse History:     Last Reviewed on 2021 09:44 AM by Esvin Gilliam    NEGATIVE         Mental Health History:     Last Reviewed on 2020 07:54 PM by Corey  Panda        Communicable Diseases (eg STDs):     Last Reviewed on 1/29/2020 07:54 PM by Panda Nicole        Current Problems:     Last Reviewed on 2/04/2021 09:44 AM by Esvin Gilliam    Pure hypercholesterolemia (mild)    Primary generalized (osteo)arthritis    Anxiety disorder, unspecified    Other long term (current) drug therapy    Diverticulosis    Allergies    Paroxysmal atrial fibrillation    Obstructive sleep apnea (cannot tolerate CPAP)    Essential (primary) hypertension        Immunizations:     influenza, high-dose, quadrivalent (FLUZONE HIGH-DOSE QUAD 2020-21) 10/22/2020    Prevnar 13 (Pneumococcal PCV 13) 2/2/2018    Fluzone Quadrivalent (3+ years) 10/21/2016    Fluzone High-Dose pf (>=65 yr) 10/31/2017    Fluzone High-Dose pf (>=65 yr) 11/14/2018    Fluzone High-Dose pf (>=65 yr) 10/7/2019    PNEUMOVAX 23 (Pneumococcal PPV23) 2/26/2019        Allergies:     Last Reviewed on 2/04/2021 09:44 AM by Esvin Gilliam    No Known Allergies.        Current Medications:     Last Reviewed on 2/04/2021 09:44 AM by Esvin Gilliam    multivitamin     aspirin 81 mg oral tablet, delayed release (enteric coated) [take 1 tablet (81 mg) by oral route once daily]    Tramadol 50 mg oral tablet [1 tab bid. prn]    busPIRone 10 mg oral tablet [TAKE ONE TABLET BY MOUTH TWICE A DAY AS NEEDED]    Diclofenac Sodium 75 mg oral tablet, delayed release (enteric coated) [1 tab bid]    amiodarone 200 mg oral tablet [1 tab daily ]    PARoxetine HCl 10 mg oral tablet [TAKE ONE TABLET BY MOUTH DAILY]    cyclobenzaprine 10 mg oral tablet [take 1 tablet (10 mg) by oral route 3 times per day as needed]    losartan 100 mg oral tablet [take 1 tablet (100 mg) by oral route once daily]    hydroCHLOROthiazide 12.5 mg oral capsule [take 1 capsule (12.5 mg) by oral route once daily]        Objective:        Vitals:         Current: 2/4/2021 9:32:56 AM    Ht:  5 ft, 5 in;  Wt: 229 lbs;  BMI: 38.1T: 96 F (temporal);   BP: 151/64 mm Hg (left arm, sitting);  P: 60 bpm (left arm (BP Cuff), sitting);  sCr: 1.6 mg/dL;  GFR: 44.51        Exams:     PHYSICAL EXAM:     GENERAL: Vitals recorded well developed, well nourished;  no apparent distress;     NECK: trachea is midline; thyroid is non-palpable;     RESPIRATORY: normal respiratory rate and pattern with no distress; normal breath sounds with no rales, rhonchi, wheezes or rubs;     CARDIOVASCULAR: normal rate; rhythm is regular;  no systolic murmur; no edema;     GASTROINTESTINAL: nontender;     LYMPHATIC: no enlargement of cervical or facial nodes;     NEUROLOGIC: GROSSLT INTACT;     PSYCHIATRIC:  appropriate affect and demeanor; normal speech pattern; grossly normal memory;         Lab/Test Results:         Creatinine, Serum: 1.15 (mg/dl) (02/26/2019), 1.60 (mg/dl) (11/30/2020),     Glom Filt Rate, Est: >60 (ml/min/1.73m2) (02/26/2019), 43 (ml/min/1.73m2) (11/30/2020),             Assessment:         E78.0   Pure hypercholesterolemia (mild)       I10   Essential (primary) hypertension       M15.0   Primary generalized (osteo)arthritis   (Worsening)     R94.4   Abnormal results of kidney function studies           ORDERS:         Radiology/Test Orders:       3017F  Colorectal CA screen results documented and reviewed (PV)  (In-House)              Lab Orders:       [id number]  [facility] - [facility] Comp. Metabolic Panel  (Send-Out)            80635  [facility] Lipid Panel  (Send-Out)            APPTO  Appointment need  (In-House)              Other Orders:       1101F  Pt screen for fall risk; document no falls in past year or only 1 fall w/o injury in past year (LOGAN)  (In-House)                      Plan:         Pure hypercholesterolemia (mild)    LABORATORY:  Labs ordered to be performed today include Comprehensive metabolic panel and lipid panel.  MIPS Has had no falls or only one fall without injury in the past year Vaccines Flu and Pneumonia updated in Shot record Colorectal Cancer Screening  is up to date and the results are in the chart     FOLLOW-UP: Schedule a follow-up visit in 6 months.:.:for Medicare Wellness Visit           Orders:       [id number]  [facility] Comp. Metabolic Panel  (Send-Out)            88637  [facility] Lipid Panel  (Send-Out)            1101F  Pt screen for fall risk; document no falls in past year or only 1 fall w/o injury in past year (LOGAN)  (In-House)            3017F  Colorectal CA screen results documented and reviewed (PV)  (In-House)            APPTO  Appointment need  (In-House)              Essential (primary) hypertension        MEDICATIONS: I will change the dose of his an angiotensin II receptor blocker.      RECOMMENDATIONS given include: perform routine monitoring of blood pressure with home blood pressure cuff.      STABLE ON CURRENT MEDS.          Primary generalized (osteo)arthritis        SYMPTOMS WORSE OFF THE NSAIDS.  CONSIDER RESTARTING BASED ON LAB RESULTS.          Abnormal results of kidney function studies        LABS FROM 11/30/20 REVIEWED, CONSIDER NEPHROLOGY EVAL.              Patient Recommendations:        For  Pure hypercholesterolemia (mild):    Schedule a follow-up visit in 6 months.                APPOINTMENT INFORMATION:        Monday Tuesday Wednesday Thursday Friday Saturday Sunday            Time:___________________AM  PM   Date:_____________________         For  Essential (primary) hypertension:    Begin monitoring your blood pressure by brief nurse visits at our office, a home blood pressure monitor, or by checking on the machines in pharmacies or stores.  Keep a log of the readings.          For  Abnormal results of kidney function studies:    I also recommend LABS FROM 11/30/20 REVIEWED, CONSIDER NEPHROLOGY EVAL..              Charge Capture:         Primary Diagnosis:     E78.0  Pure hypercholesterolemia (mild)           Orders:      [id number]  Office/outpatient visit; established patient, level 4  (In-House)            1101F  Pt screen  for fall risk; document no falls in past year or only 1 fall w/o injury in past year (LOGAN)  (In-House)            3017F  Colorectal CA screen results documented and reviewed (PV)  (In-House)            APPTO  Appointment need  (In-House)              I10  Essential (primary) hypertension     M15.0  Primary generalized (osteo)arthritis     R94.4  Abnormal results of kidney function studies

## 2021-05-18 NOTE — PROGRESS NOTES
"Xavier Polanco PATRICIA  1950     Office/Outpatient Visit    Visit Date: Mon, Mar 23, 2020 10:23 am    Provider: Esvin Gilliam MD (Assistant: Shelly Bowers MA)    Location: [facility]        Electronically signed by Esvin Gilliam MD on  03/23/2020 01:34:13 PM                             Subjective:        CC: Francisco is a 69 year old White male.  He presents with numbness in hands, cold like sx per patient, cough, running nose and congestion.          HPI:           Complaint of paresthesia of skin..  The symptom began several weeks ago.  The severity is of moderate intensity.  BOTH HANDS ARE \"NUMB\"           Concerning benign paroxysmal vertigo, unspecified ear, this first began approximately 6 weeks ago.  He describes the sensation as \"UNSTEADINESS\".  This is fairly mild in severity.  The frequency of the episodes is waxes and wanes.  He states that it is worsened with sudden position changes and turning the head.  The dizziness improves with lying still.  NEG W/U IN ER, STILL FEELS \"UNSTEADY\"           In regard to the acute bronchitis, unspecified, these have been present for the past one week.  The symptoms include chest congestion, cough and sputum production.  Medical history is significant for allergies.      ROS:     CONSTITUTIONAL:  Positive for fatigue.   Negative for chills or fever.      EYES:  Negative for blurred vision and eye drainage.      E/N/T:  Positive for nasal congestion.   Negative for ear pain, diminished hearing, tinnitus or sore throat.      CARDIOVASCULAR:  Negative for chest pain, palpitations and pedal edema.      RESPIRATORY:  Negative for dyspnea.      GASTROINTESTINAL:  Negative for abdominal pain, anorexia, constipation, diarrhea, nausea and vomiting.      GENITOURINARY:  Negative for dysuria and hematuria.      MUSCULOSKELETAL:  Positive for arthralgias (DIFFUSE, CHRONIC).   Negative for myalgias.      NEUROLOGICAL:  Negative for fainting, headaches and " weakness.          Past Medical History / Family History / Social History:         Last Reviewed on 3/23/2020 10:51 AM by Esvin Gilliam    Past Medical History:             PREVENTIVE HEALTH MAINTENANCE             EVALUATION FOR AORTIC ANEURYSM: was last done  with normal results     COLORECTAL CANCER SCREENING: Up to date (colonoscopy q10y; sigmoidoscopy q5y; Cologuard q3y) was last done , Results are in chart; colonoscopy with normal results     DENTAL CLEANING: RECOMMENDED 19     EYE EXAM: RECOMMENDED 19     Hepatitis C Medicare Screening: was last done      PSA: was last done  with normal results         PAST MEDICAL HISTORY             CURRENT MEDICAL PROVIDERS:    Cardiologist    Pulmonologist             ADVANCE DIRECTIVES: Living will         Surgical History:         Arthroscopy: left shoulder;     Cataract Removal: bilateral;     Fracture Repair: L/R ANKLE. PELVIS, L CLAVICLE, L HUMERUS;;;         Family History:     Father: Cause of death was cancer     Mother:      Brother(s): 5 brother(s) total; 1     ; Positive for Skin Cancer ( Melanoma );     Sister(s): 4 sister(s) total     Son(s): 1 son(s) total     Daughter(s): 5 daughter(s) total         Social History:     Occupation:    Retired         Tobacco/Alcohol/Supplements:     Last Reviewed on 3/23/2020 10:51 AM by Esvin Gilliam    Tobacco: He has a past history of cigar smoking. Quit Date:  17         Alcohol: Frequency: Daily When he drinks, the average quantity of alcohol is 3 drinks.   He typically consumes beer.      Caffeine:  He admits to consuming caffeine via coffee ( 2 servings per day ).          Substance Abuse History:     Last Reviewed on 3/23/2020 10:51 AM by Esvin Gilliam    NEGATIVE         Mental Health History:     Last Reviewed on 2020 07:54 PM by Panda Nicole        Communicable Diseases (eg STDs):     Last Reviewed on 2020 07:54 PM by Corey  Panda        Current Problems:     Last Reviewed on 3/23/2020 10:51 AM by Esvin Gilliam    Pure hypercholesterolemia (mild)    Primary generalized (osteo)arthritis    Anxiety disorder, unspecified    Other long term (current) drug therapy    Diverticulosis    Allergies    Essential (primary) hypertension    Obstructive sleep apnea (adult) (pediatric)    Paroxysmal atrial fibrillation    Unspecified asthma, uncomplicated    Paresthesia of skin    Benign paroxysmal vertigo, unspecified ear        Immunizations:     Prevnar 13 (Pneumococcal PCV 13) 2/2/2018    Fluzone Quadrivalent (3+ years) 10/21/2016    Fluzone High-Dose pf (>=65 yr) 10/31/2017    Fluzone High-Dose pf (>=65 yr) 11/14/2018    Fluzone High-Dose pf (>=65 yr) 10/7/2019    PNEUMOVAX 23 (Pneumococcal PPV23) 2/26/2019        Allergies:     Last Reviewed on 3/23/2020 10:51 AM by Esvin Gilliam    No Known Allergies.        Current Medications:     Last Reviewed on 3/23/2020 10:51 AM by Esvin Gilliam    aspirin 81 mg oral tablet, delayed release (enteric coated) [take 1 tablet (81 mg) by oral route once daily]    meclizine 25 mg oral tablet [take 1 tablet (25 mg) by oral route twice daily ]    Tramadol 50 mg oral tablet [1 tab bid. prn]    busPIRone 10 mg oral tablet [Take 1 tablet by mouth twice daily as needed]    Diclofenac Sodium 75 mg oral tablet, delayed release (enteric coated) [1 tab bid]    amiodarone 200 mg oral tablet [1 tab daily ]    Valsartan 80 mg oral tablet [Take 1 tablet(s) by mouth daily]    PARoxetine HCl 10 mg oral tablet [Take 1 tablet(s) by mouth daily]    Ventolin HFA 90 mcg/actuation Inhalation HFA Aerosol Inhaler [inhale 1 - 2 puffs (90 - 180 mcg) by inhalation route every 4 hours as needed]        Objective:        Vitals:         Current: 3/23/2020 10:34:36 AM    Ht:  5 ft, 5 in;  Wt: 228.8 lbs;  BMI: 38.1T: 98.6 F (oral);  BP: 124/49 mm Hg (left arm, sitting);  P: 59 bpm (left arm (BP Cuff),  sitting);  sCr: 1.15 mg/dL;  GFR: 62.75        Exams:     PHYSICAL EXAM:     GENERAL:  well developed and nourished; appropriately groomed; in no apparent distress;     EYES: conjunctiva and cornea are normal;     E/N/T:  normal EACs, TMs, nasal/oral mucosa, teeth, gingiva, and oropharynx;     NECK: trachea is midline; thyroid is non-palpable; carotid exam reveals no bruits;     RESPIRATORY: normal respiratory rate and pattern with no distress; normal breath sounds with no rales, rhonchi, wheezes or rubs;     CARDIOVASCULAR: normal rate; rhythm is regular;  no systolic murmur; no edema;     LYMPHATIC: no enlargement of cervical or facial nodes; no supraclavicular nodes;     MUSCULOSKELETAL: normal gait; normal overall tone     NEUROLOGIC: GROSSLT INTACT;     PSYCHIATRIC:  appropriate affect and demeanor; normal speech pattern; grossly normal memory; RIGHT WRIST examination: SAME FOR LEFT Inspection:  normal;     Palpation: HAND FEELS COOL TO THE TOUCH;     Neurovascular:  normal;     Muscular Strength:  normal;     Range of Motion: full active ROM;     Maneuvers: (-) Phalen's test;  (-) Tinel sign;             Assessment:         R26.81   Unsteadiness on feet       G56.03   Carpal tunnel syndrome, bilateral upper limbs   PROBABLE     J20.9   Acute bronchitis, unspecified           ORDERS:         Meds Prescribed:       [New Rx] cephALEXin 500 mg oral capsule [take 1 capsule (500 mg) by oral route TID], #30 (thirty) capsules, Refills: 0 (zero)         Procedures Ordered:       REFER  Referral to Specialist or Other Facility  (Send-Out)                      Plan:         Unsteadiness on feet        REFERRALS:  Referral initiated to a neurologist ( Dr. Xavier [facility] ).            Orders:       REFER  Referral to Specialist or Other Facility  (Send-Out)              Carpal tunnel syndrome, bilateral upper limbs        MEDICATIONS: (no change to current medication regimen)     RX GIVEN FOR COCK-UP  SPLINTS.  CONSIDER EMG         Acute bronchitis, unspecified    OTC meds PRN           Prescriptions:       [New Rx] cephALEXin 500 mg oral capsule [take 1 capsule (500 mg) by oral route TID], #30 (thirty) capsules, Refills: 0 (zero)             Patient Recommendations:        For  Carpal tunnel syndrome, bilateral upper limbs:    I also recommend RX GIVEN FOR COCK-UP SPLINTS.  CONSIDER EMG.              Charge Capture:         Primary Diagnosis:     R26.81  Unsteadiness on feet           Orders:      68976  Office/outpatient visit; established patient, level 4  (In-House)              G56.03  Carpal tunnel syndrome, bilateral upper limbs     J20.9  Acute bronchitis, unspecified         ADDENDUMS:      ____________________________________    Addendum: 04/15/2020 01:53 PM - Zenia Zamarripa         Visit Note Faxed to:        User Entered Recipient; Number [phone]
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
## 2021-05-18 NOTE — PROGRESS NOTES
Xavier Polanco PATRICIA  1950     Office/Outpatient Visit    Visit Date: Tue, May 26, 2020 02:21 pm    Provider: Esvin Gilliam MD (Assistant: Selena Gusman MA)    Location: [facility]        Electronically signed by Esvin Gilliam MD on  05/26/2020 05:27:38 PM                             Subjective:        CC: Francisco is a 69 year old White male.  He is here today following a transition of care from an inpatient hospital: [facility]. The patient was admitted on 4-15-20 and discharged on 5-13-20. The patient was admitted for fall, numbness, weakness. Our office called the patient within 48 hours of discharge and scheduled the follow-up appointment. During the patient's hospital stay the patient was treated by Dr. Tapia.  pt says he was changed from valsartan to losartan mg qd, hasnt been taking aspirin since hospital discharge         HPI:       (Improving)     Francisco presents in follow up from hospital admission He was diagnosed with CERVICAL CENTRAL CORD SYNDROMW.  The following radiology tests were done: cervical spine MRI.  The following procedures were done: CERVICAL LAMINECTOMY The patient received STAY IN THE REHAB UNIT.  HOME HEALTH FOLLOWING.            In regard to the essential (primary) hypertension, this was first diagnosed several years ago.  His current cardiac medication regimen includes an angiotensin receptor blocker.  Compliance with treatment has been good.  ARB WAS CHANGED IN THE HOSPITAL     ROS:     CONSTITUTIONAL:  Positive for fatigue.   Negative for chills or fever.      E/N/T:  Positive for nasal congestion.   Negative for ear pain, diminished hearing, tinnitus or sore throat.      CARDIOVASCULAR:  Negative for chest pain, palpitations and pedal edema.      RESPIRATORY:  Negative for dyspnea.      GASTROINTESTINAL:  Negative for abdominal pain, anorexia, constipation, diarrhea, nausea and vomiting.      GENITOURINARY:  Negative for urinary incontinence.       MUSCULOSKELETAL:  Positive for arthralgias (DIFFUSE, CHRONIC).   Negative for myalgias.      NEUROLOGICAL:  Positive for ataxia and (UNSTEADINESS IS IMPROVING) paresthesia ( bilateral upper extremity; IMPROVING ).   Negative for fainting, headaches, tremor or weakness.      PSYCHIATRIC:  Positive for feelings of stress.          Past Medical History / Family History / Social History:         Last Reviewed on 2020 03:09 PM by Esvin Gilliam    Past Medical History:             PREVENTIVE HEALTH MAINTENANCE             EVALUATION FOR AORTIC ANEURYSM: was last done  with normal results     COLORECTAL CANCER SCREENING: Up to date (colonoscopy q10y; sigmoidoscopy q5y; Cologuard q3y) was last done , Results are in chart; colonoscopy with normal results     DENTAL CLEANING: RECOMMENDED 19     EYE EXAM: RECOMMENDED 19     Hepatitis C Medicare Screening: was last done      PSA: was last done  with normal results         PAST MEDICAL HISTORY             CURRENT MEDICAL PROVIDERS:    Cardiologist    Pulmonologist             ADVANCE DIRECTIVES: Living will         Surgical History:         Arthroscopy: left shoulder;     Cataract Removal: bilateral;     Fracture Repair: L/R ANKLE. PELVIS, L CLAVICLE, L HUMERUS;;;         Family History:     Father: Cause of death was cancer     Mother:      Brother(s): 5 brother(s) total; 1     ; Positive for Skin Cancer ( Melanoma );     Sister(s): 4 sister(s) total     Son(s): 1 son(s) total     Daughter(s): 5 daughter(s) total         Social History:     Occupation:    Retired         Tobacco/Alcohol/Supplements:     Last Reviewed on 2020 03:09 PM by Esvin Gilliam    Tobacco: He has a past history of cigar smoking. Quit Date:  17         Alcohol: Frequency: Daily When he drinks, the average quantity of alcohol is 3 drinks.   He typically consumes beer.      Caffeine:  He admits to consuming caffeine via coffee ( 2  servings per day ).          Substance Abuse History:     Last Reviewed on 5/26/2020 03:09 PM by Esvin Gilliam    NEGATIVE         Mental Health History:     Last Reviewed on 1/29/2020 07:54 PM by Panda Nicole        Communicable Diseases (eg STDs):     Last Reviewed on 1/29/2020 07:54 PM by Panda Nicole        Current Problems:     Last Reviewed on 5/26/2020 03:09 PM by Esvin Gilliam    Pure hypercholesterolemia (mild)    Primary generalized (osteo)arthritis    Anxiety disorder, unspecified    Other long term (current) drug therapy    Diverticulosis    Allergies    Essential (primary) hypertension    Obstructive sleep apnea (cannot tolerate CPAP)    Paroxysmal atrial fibrillation    Unspecified asthma, uncomplicated    Unsteadiness on feet    Carpal tunnel syndrome, bilateral upper limbs    Encounter for follow-up examination after completed treatment for conditions other than malignant neoplasm        Immunizations:     Prevnar 13 (Pneumococcal PCV 13) 2/2/2018    Fluzone Quadrivalent (3+ years) 10/21/2016    Fluzone High-Dose pf (>=65 yr) 10/31/2017    Fluzone High-Dose pf (>=65 yr) 11/14/2018    Fluzone High-Dose pf (>=65 yr) 10/7/2019    PNEUMOVAX 23 (Pneumococcal PPV23) 2/26/2019        Allergies:     Last Reviewed on 5/26/2020 03:09 PM by Esvin Gilliam    No Known Allergies.        Current Medications:     Last Reviewed on 5/26/2020 03:09 PM by Esvin Gilliam    aspirin 81 mg oral tablet, delayed release (enteric coated) [take 1 tablet (81 mg) by oral route once daily]    meclizine 25 mg oral tablet [take 1 tablet (25 mg) by oral route twice daily ]    Tramadol 50 mg oral tablet [1 tab bid. prn]    busPIRone 10 mg oral tablet [TAKE ONE TABLET BY MOUTH TWICE A DAY AS NEEDED]    Diclofenac Sodium 75 mg oral tablet, delayed release (enteric coated) [1 tab bid]    amiodarone 200 mg oral tablet [1 tab daily ]    Valsartan 80 mg oral tablet [Take 1 tablet(s) by mouth  daily]    PARoxetine HCl 10 mg oral tablet [Take 1 tablet(s) by mouth daily]    Ventolin HFA 90 mcg/actuation Inhalation HFA Aerosol Inhaler [inhale 1 - 2 puffs (90 - 180 mcg) by inhalation route every 4 hours as needed]    cephALEXin 500 mg oral capsule [take 1 capsule (500 mg) by oral route TID]    cyclobenzaprine 10 mg oral tablet [take 1 tablet (10 mg) by oral route 3 times per day as needed]    multivitamin         Objective:        Vitals:         Current: 5/26/2020 2:27:57 PM    Ht:  5 ft, 5 in;  Wt: 213.4 lbs;  BMI: 35.5T: 98.8 F (oral);  BP: 149/50 mm Hg (left arm, sitting);  P: 73 bpm (left arm (BP Cuff), sitting);  sCr: 1.15 mg/dL;  GFR: 60.92        Exams:     PHYSICAL EXAM:     GENERAL:  well developed and nourished; appropriately groomed; in no apparent distress;     NECK: trachea is midline; thyroid is non-palpable; carotid exam reveals no bruits;     RESPIRATORY: normal respiratory rate and pattern with no distress; normal breath sounds with no rales, rhonchi, wheezes or rubs;     CARDIOVASCULAR: normal rate; rhythm is regular;  no systolic murmur; no edema;     LYMPHATIC: no enlargement of cervical or facial nodes; no supraclavicular nodes;     MUSCULOSKELETAL: gait: uses a walker;  normal overall tone     NEUROLOGIC: GROSSLT INTACT;     PSYCHIATRIC:  appropriate affect and demeanor; normal speech pattern; grossly normal memory;         Assessment:         S14.129A   Central cord syndrome at unspecified level of cervical spinal cord, initial encounter   (Improving)     R26.81   Unsteadiness on feet   (Improving)     G56.03   Carpal tunnel syndrome, bilateral upper limbs   (Improving)     I10   Essential (primary) hypertension           Plan:         Central cord syndrome at unspecified level of cervical spinal cord, initial encounter        MEDICATIONS: (no change to current medication regimen)     RECOMMENDATIONS given include: F/U WITH SURGEON AS PLANNED.      FOLLOW-UP: Schedule a follow-up visit  in 3 months.  Observe as improved         Unsteadiness on feet    Consider further workup Observe as improved         Carpal tunnel syndrome, bilateral upper limbs    Consider further workup Observe as improved         Essential (primary) hypertension        MEDICATIONS: (no change to current medication regimen)     RECOMMENDATIONS given include: perform routine monitoring of blood pressure with home blood pressure cuff.      REFERRALS:  Referral initiated to a cardiologist ( HE WILL SEE THEM AGAIN SOON ).              Patient Recommendations:        For  Central cord syndrome at unspecified level of cervical spinal cord, initial encounter:    Schedule a follow-up visit in 3 months.          For  Essential (primary) hypertension:    Begin monitoring your blood pressure by brief nurse visits at our office, a home blood pressure monitor, or by checking on the machines in pharmacies or stores.  Keep a log of the readings.              Charge Capture:         Primary Diagnosis:     S14.129A  Central cord syndrome at unspecified level of cervical spinal cord, initial encounter           Orders:      38276  Office/outpatient visit; established patient, level 4  (In-House)              R26.81  Unsteadiness on feet     G56.03  Carpal tunnel syndrome, bilateral upper limbs     I10  Essential (primary) hypertension
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
## 2021-05-18 NOTE — PROGRESS NOTES
CatharpinXavier 1950     Office/Outpatient Visit    Visit Date: Tue, Oct 22, 2019 02:31 pm    Provider: Esvin Gilliam MD (Assistant: Shelly Bowers MA)    Location: [facility]        Electronically signed by Esvin Gilliam MD on  10/22/2019 04:25:12 PM                             SUBJECTIVE:        CC:     Francisco is a 68 year old White male.  This is a follow-up visit.  MCW         HPI:         PHQ-9 Depression Screening: Completed form scanned and in chart; Total Score 0         Concerning hTN, his current cardiac medication regimen includes an angiotensin receptor blocker.  Compliance with treatment has been good.      ROS:     CONSTITUTIONAL:  Negative for chills and fever.      E/N/T:  Negative for ear pain, nasal congestion and sore throat.      CARDIOVASCULAR:  Negative for chest pain, palpitations and pedal edema.      RESPIRATORY:  Negative for recent cough and dyspnea.      GASTROINTESTINAL:  Negative for abdominal pain, anorexia, nausea and vomiting.      NEUROLOGICAL:  Negative for dizziness and headaches.          PM/[facility], [place]/SH:     Last Reviewed on 10/22/2019 02:41 PM by Esvin Gilliam    Past Medical History:             PREVENTIVE HEALTH MAINTENANCE             EVALUATION FOR AORTIC ANEURYSM: was last done 2018 with normal results     COLORECTAL CANCER SCREENING: Up to date (colonoscopy q10y; sigmoidoscopy q5y; Cologuard q3y) was last done 2015, Results are in chart; colonoscopy with normal results     DENTAL CLEANING: RECOMMENDED 2/26/19     EYE EXAM: RECOMMENDED 2/26/19     Hepatitis C Medicare Screening: was last done 2018     PSA: was last done 2019 with normal results         PAST MEDICAL HISTORY             CURRENT MEDICAL PROVIDERS:    Cardiologist    Pulmonologist             ADVANCE DIRECTIVES: Living will         Surgical History:         Arthroscopy: left shoulder;     Cataract Removal: bilateral;     Fracture Repair: L/R ANKLE. PELVIS, L CLAVICLE, L  HUMERUS;;;         Family History:     Father: Cause of death was cancer     Mother:      Brother(s): 5 brother(s) total; 1     ; Positive for Skin Cancer ( Melanoma );     Sister(s): 4 sister(s) total     Son(s): 1 son(s) total     Daughter(s): 5 daughter(s) total         Social History:     Occupation:    Retired         Tobacco/Alcohol/Supplements:     Last Reviewed on 10/22/2019 02:40 PM by Esvin Gilliam    Tobacco: He has a past history of cigar smoking. Quit Date:  17         Alcohol: Frequency: Daily When he drinks, the average quantity of alcohol is 3 drinks.   He typically consumes beer.      Caffeine:  He admits to consuming caffeine via coffee ( 2 servings per day ).          Substance Abuse History:     Last Reviewed on 10/22/2019 02:40 PM by Esvin Gilliam    NEGATIVE         Mental Health History:     Last Reviewed on 2019 10:44 AM by Paula Avalos        Communicable Diseases (eg STDs):     Last Reviewed on 2019 10:44 AM by Paula Avalos            Current Problems:     Last Reviewed on 10/22/2019 02:44 PM by Esvin Gilliam    Obstructive sleep apnea (CPAP pending)     Paroxysmal atrial fibrillation     HTN     Allergies     Diverticulosis     Use of high risk medications     Anxiety     Generalized osteoarthritis, multiple sites     Elevated cholesterol     Screening for depression         Immunizations:     Prevnar 13 (Pneumococcal PCV 13) 2018     Fluzone Quadrivalent (3+ years) 10/21/2016     Fluzone High-Dose pf (>=65 yr) 10/31/2017     Fluzone High-Dose pf (>=65 yr) 2018     Fluzone High-Dose pf (>=65 yr) 10/7/2019     PNEUMOVAX 23 (Pneumococcal PPV23) 2019         Allergies:     Last Reviewed on 10/22/2019 02:40 PM by Esvin Gilliam      No Known Drug Allergies.         Current Medications:     Last Reviewed on 10/22/2019 02:43 PM by Esvin Gilliam    Diclofenac Sodium 75mg Tablets, Enteric Coated  1 tab bid     Tramadol 50mg Tablet 1 tab bid. prn     Buspirone HCl 10mg Tablet Take 1 tablet by mouth twice daily as needed     Paroxetine HCl 10mg Tablet Take 1 tablet(s) by mouth daily     Valsartan 80mg Tablet Take 1 tablet(s) by mouth daily     Amiodarone HCl 200mg Tablet 1 tab daily         OBJECTIVE:        Vitals:         Current: 10/22/2019 2:40:52 PM    Ht:  5 ft, 5 in;  Wt: 232 lbs;  BMI: 38.6    T: 98.2 F (oral);  BP: 129/87 mm Hg (left arm, sitting);  P: 53 bpm (left arm (BP Cuff), sitting);  sCr: 1.15 mg/dL;  GFR: 63.97    VA: 20/50 OD, 20/70 OS        Exams:     PHYSICAL EXAM:     GENERAL: Vitals recorded well developed, well nourished;  no apparent distress;     NECK: trachea is midline; thyroid is non-palpable;     RESPIRATORY: normal respiratory rate and pattern with no distress; normal breath sounds with no rales, rhonchi, wheezes or rubs;     CARDIOVASCULAR: normal rate; rhythm is regular;  no systolic murmur; no edema;     GASTROINTESTINAL: nontender;     LYMPHATIC: no enlargement of cervical or facial nodes;     NEUROLOGIC: GROSSLT INTACT;     PSYCHIATRIC:  appropriate affect and demeanor; normal speech pattern; grossly normal memory;         ASSESSMENT           401.1   I10  HTN              DDx:     V79.0   Z13.89  Screening for depression              DDx:         ORDERS:         Lab Orders:       APPTO  Appointment need  (In-House)           Other Orders:         Depression screen negative  (In-House)         NOCHG  No Charge  (In-House)                   PLAN:          HTN         MEDICATIONS: (no change to current medication regimen)     FOLLOW-UP: Schedule a follow-up visit in 6 months.:.:for Medicare Wellness Visit           Orders:       APPTO  Appointment need  (In-House)         NOCHG  No Charge  (In-House)            Screening for depression     MIPS Negative Depression Screen           Orders:         Depression screen negative  (In-House)               Patient  Recommendations:        For  HTN:     Schedule a follow-up visit in 6 months.                APPOINTMENT INFORMATION:        Monday Tuesday Wednesday Thursday Friday Saturday Sunday            Time:___________________AM  PM   Date:_____________________             CHARGE CAPTURE           **Please note: ICD descriptions below are intended for billing purposes only and may not represent clinical diagnoses**        Primary Diagnosis:         401.1 HTN            I10    Essential (primary) hypertension              Orders:          APPTO   Appointment need  (In-House)             NOCHG   No Charge  (In-House)           V79.0 Screening for depression            Z13.89    Encounter for screening for other disorder              Orders:             Depression screen negative  (In-House)

## 2021-05-18 NOTE — PROGRESS NOTES
Xavier Polanco  1950     Office/Outpatient Visit    Visit Date: Mon, Dec 23, 2019 10:18 am    Provider: Panda Nicole MD (Assistant: Spurling, Sarah C, MA)    Location: [facility]        Electronically signed by Panda Nicole MD on  12/23/2019 02:34:53 PM                             Subjective:        CC: Francisco is a 69 year old White male.  needing a paper filled up for an appt about his lungs.          HPI:       Patient has a longstanding history of asthma.  Today, he brings paperwork with him to be filled out for the [employer].  He says that he recently had a breathalyzer placed in his vehicle due to receiving a driving under the influence charge.  He notes that when he attempts to use this machine he cannot summon enough air for the machine to function properly.  He was told that he needed to have doctors paperwork that demonstrated objective lung deficits in order to have this machine removed from his vehicle. He had PFTs as recent as October 2017 that showed mild obstructive deficit with a significant bronchodilator response.  However, his FEV1 was 1.78 L which is well above the limit that his current paperwork is requiring of 1 to 1.2 L. He endorses mild dyspnea with exertion as well as intermittent wheezing.  No chest pain.  He is not currently on any medications.  He has a longstanding history of sleep apnea for which she had originally been prescribed CPAP but says he was unable to tolerate this therapy.    ROS:     CONSTITUTIONAL:  Negative for chills, fatigue and fever.      CARDIOVASCULAR:  Negative for chest pain, dizziness, palpitations and edema.      RESPIRATORY:  Positive for dyspnea, frequent wheezing, sleep apnea and cough.      GASTROINTESTINAL:  Negative for diarrhea, heartburn, nausea and vomiting.      NEUROLOGICAL:  Negative for headaches, paresthesias and weakness.      PSYCHIATRIC:  Positive for sleep disturbance.          Past Medical History /  Family History / Social History:         Last Reviewed on 2019 02:34 PM by Panda Nicole    Past Medical History:             PREVENTIVE HEALTH MAINTENANCE             EVALUATION FOR AORTIC ANEURYSM: was last done  with normal results     COLORECTAL CANCER SCREENING: Up to date (colonoscopy q10y; sigmoidoscopy q5y; Cologuard q3y) was last done , Results are in chart; colonoscopy with normal results     DENTAL CLEANING: RECOMMENDED 19     EYE EXAM: RECOMMENDED 19     Hepatitis C Medicare Screening: was last done      PSA: was last done  with normal results         PAST MEDICAL HISTORY             CURRENT MEDICAL PROVIDERS:    Cardiologist    Pulmonologist             ADVANCE DIRECTIVES: Living will         Surgical History:         Arthroscopy: left shoulder;     Cataract Removal: bilateral;     Fracture Repair: L/R ANKLE. PELVIS, L CLAVICLE, L HUMERUS;;;         Family History:     Father: Cause of death was cancer     Mother:      Brother(s): 5 brother(s) total; 1     ; Positive for Skin Cancer ( Melanoma );     Sister(s): 4 sister(s) total     Son(s): 1 son(s) total     Daughter(s): 5 daughter(s) total         Social History:     Occupation:    Retired         Tobacco/Alcohol/Supplements:     Last Reviewed on 2019 02:34 PM by Panda Nicole    Tobacco: He has a past history of cigar smoking. Quit Date:  17         Alcohol: Frequency: Daily When he drinks, the average quantity of alcohol is 3 drinks.   He typically consumes beer.      Caffeine:  He admits to consuming caffeine via coffee ( 2 servings per day ).          Substance Abuse History:     Last Reviewed on 2019 02:34 PM by Panda Nicole    NEGATIVE         Mental Health History:     Last Reviewed on 2019 02:34 PM by Panda Nicole        Communicable Diseases (eg STDs):     Last Reviewed on 2019 02:34 PM by Panda Nicole        Current Problems:     Last Reviewed on 2019 02:34  "PM by Panda Nicole    Generalized osteoarthritis, multiple sites    Pure hypercholesterolemia    Primary generalized (osteo)arthritis    Elevated cholesterol    Anxiety    Anxiety disorder, unspecified    Other long term (current) drug therapy    Use of high risk medications    Diverticulosis    Allergies    HTN    Essential (primary) hypertension    Obstructive sleep apnea (CPAP pending)    Unspecified asthma, uncomplicated    Paroxysmal atrial fibrillation    Obstructive sleep apnea (adult) (pediatric)        Immunizations:     Prevnar 13 (Pneumococcal PCV 13) 2/2/2018    Fluzone Quadrivalent (3+ years) 10/21/2016    Fluzone High-Dose pf (>=65 yr) 10/31/2017    Fluzone High-Dose pf (>=65 yr) 11/14/2018    Fluzone High-Dose pf (>=65 yr) 10/7/2019    PNEUMOVAX 23 (Pneumococcal PPV23) 2/26/2019        Allergies:     Last Reviewed on 12/23/2019 02:34 PM by Panda Nicole    No Known Allergies.        Current Medications:     Last Reviewed on 12/23/2019 02:34 PM by Panda Nicole    Tramadol 50 mg oral tablet [1 tab bid. prn]    busPIRone 10 mg oral tablet [Take 1 tablet by mouth twice daily as needed]    Diclofenac Sodium 75 mg oral tablet, delayed release (enteric coated) [1 tab bid]    amiodarone 200 mg oral tablet [1 tab daily ]    Valsartan 80 mg oral tablet [Take 1 tablet(s) by mouth daily]    Paroxetine HCl 10 mg oral tablet [Take 1 tablet(s) by mouth daily]        Objective:        Vitals:         Current: 12/23/2019 10:30:40 AM    Ht:  5 ft, 5 in;  Wt: 235.6 lbs;  BMI: 39.2T: 98.6 F (oral);  BP: 140/62 mm Hg (right arm, sitting);  P: 57 bpm (right arm (BP Cuff), sitting);  sCr: 1.15 mg/dL;  GFR: 63.53        Exams:     PHYSICAL EXAM:     GENERAL: Vitals recorded well developed, well nourished;  no apparent distress;     EYES: conjunctiva and cornea are normal;     RESPIRATORY: coarse breath sounds throughout; no rales (\"crackles\") present; no rhonchi; no wheezes;     CARDIOVASCULAR: normal rate; rhythm is " regular;  No murmurs, clicks, gallops or rubs appreciated; no edema;     SKIN:  No significant rashes, lesions or suspicious moles within limits of examination;     NEUROLOGIC: Grossly intact; mental status: alert and oriented x 3;     PSYCHIATRIC: appropriate affect and demeanor; normal speech pattern; Normal behavior;         Lab/Test Results:         LABORATORY RESULTS: PFTs performed by pr         Assessment:         J45.909   Unspecified asthma, uncomplicated           ORDERS:         Meds Prescribed:       [New Rx] Ventolin HFA 90 mcg/actuation Inhalation HFA Aerosol Inhaler [inhale 1 - 2 puffs (90 - 180 mcg) by inhalation route every 4 hours as needed], #8 (eight) grams, Refills: 0 (zero)         Procedures Ordered:       [id number]  Spirometry, w/graphic record, total & timed vital capacity, expiratory flow rates w/wo max ventilate  (In-House)                      Plan:         Unspecified asthma, uncomplicated- PFTs performed in office today.  Results show mild restrictive deficit. FEV1 is still above limit to allow accommodations for his in-car breathalyzer.  Informed patient that I was going to be unable to fill the paperwork out for him.  He acknowledged understanding and was very gracious.  We will start albuterol to see if this improves some of his symptoms.  If not, we will consider performing imaging and/or pulmonology referral.          Prescriptions:       [New Rx] Ventolin HFA 90 mcg/actuation Inhalation HFA Aerosol Inhaler [inhale 1 - 2 puffs (90 - 180 mcg) by inhalation route every 4 hours as needed], #8 (eight) grams, Refills: 0 (zero)           Orders:       [id number]  Spirometry, w/graphic record, total & timed vital capacity, expiratory flow rates w/wo max ventilate  (In-House)                  Charge Capture:         Primary Diagnosis:     J45.909  Unspecified asthma, uncomplicated           Orders:      [id number]  Office/outpatient visit; established patient, level 3  (In-House)            [id number]   Spirometry, w/graphic record, total & timed vital capacity, expiratory flow rates w/wo max ventilate  (In-House)

## 2021-05-18 NOTE — PROGRESS NOTES
"Xavier Polanco PATRICIA  1950     Office/Outpatient Visit    Visit Date: Mon, Mar 9, 2020 11:14 am    Provider: Esvin Gilliam MD (Assistant: Coby Mosquera MA)    Location: [facility]        Electronically signed by Esvin Gilliam MD on  03/09/2020 02:57:41 PM                             Subjective:        CC: Francisco is a 69 year old White male.  went to er yesterday due to being dizzy and weak         HPI:           Francisco presents with dizziness and giddiness.  This first began approximately 6 weeks ago.  He describes the sensation as \"UNSTEADINESS\".  This is fairly mild in severity.  The frequency of the episodes is waxes and wanes.  He states that it is worsened with sudden position changes and turning the head.  The dizziness improves with lying still.  NEG W/U IN ER, S/W BETTER WITH MELCIZINE           Additionally, he presents with history of essential (primary) hypertension.  his current cardiac medication regimen includes an angiotensin receptor blocker.  Compliance with treatment has been good.      ROS:     CONSTITUTIONAL:  Negative for chills and fever.      EYES:  Negative for blurred vision.      E/N/T:  Negative for ear pain, diminished hearing, tinnitus, nasal congestion and sore throat.      CARDIOVASCULAR:  Negative for chest pain, palpitations and pedal edema.      RESPIRATORY:  Negative for recent cough and dyspnea.      GASTROINTESTINAL:  Negative for abdominal pain, constipation, diarrhea, nausea and vomiting.      GENITOURINARY:  Negative for dysuria and hematuria.      MUSCULOSKELETAL:  Positive for arthralgias (DIFFUSE, CHRONIC).   Negative for myalgias.      NEUROLOGICAL:  Negative for fainting, headaches and weakness.          Past Medical History / Family History / Social History:         Last Reviewed on 3/09/2020 11:48 AM by Esvin Gilliam    Past Medical History:             PREVENTIVE HEALTH MAINTENANCE             EVALUATION FOR AORTIC ANEURYSM: was last " done 2018 with normal results     COLORECTAL CANCER SCREENING: Up to date (colonoscopy q10y; sigmoidoscopy q5y; Cologuard q3y) was last done , Results are in chart; colonoscopy with normal results     DENTAL CLEANING: RECOMMENDED 19     EYE EXAM: RECOMMENDED 19     Hepatitis C Medicare Screening: was last done      PSA: was last done 2019 with normal results         PAST MEDICAL HISTORY             CURRENT MEDICAL PROVIDERS:    Cardiologist    Pulmonologist             ADVANCE DIRECTIVES: Living will         Surgical History:         Arthroscopy: left shoulder;     Cataract Removal: bilateral;     Fracture Repair: L/R ANKLE. PELVIS, L CLAVICLE, L HUMERUS;;;         Family History:     Father: Cause of death was cancer     Mother:      Brother(s): 5 brother(s) total; 1     ; Positive for Skin Cancer ( Melanoma );     Sister(s): 4 sister(s) total     Son(s): 1 son(s) total     Daughter(s): 5 daughter(s) total         Social History:     Occupation:    Retired         Tobacco/Alcohol/Supplements:     Last Reviewed on 3/09/2020 11:48 AM by Esvin Gilliam    Tobacco: He has a past history of cigar smoking. Quit Date:  17         Alcohol: Frequency: Daily When he drinks, the average quantity of alcohol is 3 drinks.   He typically consumes beer.      Caffeine:  He admits to consuming caffeine via coffee ( 2 servings per day ).          Substance Abuse History:     Last Reviewed on 3/09/2020 11:48 AM by Esvin Gilliam    NEGATIVE         Mental Health History:     Last Reviewed on 2020 07:54 PM by Panda Nicole        Communicable Diseases (eg STDs):     Last Reviewed on 2020 07:54 PM by Panda Nicole        Current Problems:     Last Reviewed on 3/09/2020 11:48 AM by Esvin Gilliam    Pure hypercholesterolemia (mild)    Primary generalized (osteo)arthritis    Anxiety disorder, unspecified    Diverticulosis    Other long term (current) drug  therapy    Essential (primary) hypertension    Allergies    Paroxysmal atrial fibrillation    Obstructive sleep apnea (adult) (pediatric)    Unspecified asthma, uncomplicated    Paresthesia of skin    Dizziness and giddiness        Immunizations:     Prevnar 13 (Pneumococcal PCV 13) 2/2/2018    Fluzone Quadrivalent (3+ years) 10/21/2016    Fluzone High-Dose pf (>=65 yr) 10/31/2017    Fluzone High-Dose pf (>=65 yr) 11/14/2018    Fluzone High-Dose pf (>=65 yr) 10/7/2019    PNEUMOVAX 23 (Pneumococcal PPV23) 2/26/2019        Allergies:     Last Reviewed on 3/09/2020 11:48 AM by Esvin Gilliam    No Known Allergies.        Current Medications:     Last Reviewed on 3/09/2020 11:48 AM by Esvin Gilliam    aspirin 81 mg oral tablet, delayed release (enteric coated) [take 1 tablet (81 mg) by oral route once daily]    Tramadol 50 mg oral tablet [1 tab bid. prn]    busPIRone 10 mg oral tablet [Take 1 tablet by mouth twice daily as needed]    Diclofenac Sodium 75 mg oral tablet, delayed release (enteric coated) [1 tab bid]    amiodarone 200 mg oral tablet [1 tab daily ]    Valsartan 80 mg oral tablet [Take 1 tablet(s) by mouth daily]    PARoxetine HCl 10 mg oral tablet [Take 1 tablet(s) by mouth daily]    Ventolin HFA 90 mcg/actuation Inhalation HFA Aerosol Inhaler [inhale 1 - 2 puffs (90 - 180 mcg) by inhalation route every 4 hours as needed]    meclizine 25 mg oral tablet [take 1 tablet (25 mg) by oral route twice daily ]        Objective:        Vitals:         Current: 3/9/2020 11:22:07 AM    Ht:  5 ft, 5 in;  Wt: 234.4 lbs;  BMI: 39.0T: 98 F (oral);  BP: 139/60 mm Hg (right arm, sitting);  P: 55 bpm (right arm (BP Cuff), sitting);  sCr: 1.15 mg/dL;  GFR: 63.39        Exams:     PHYSICAL EXAM:     GENERAL: Vitals recorded well developed, well nourished;  no apparent distress;     EYES: extraocular movements intact; conjunctiva and cornea are normal; PERRL;     E/N/T:  normal EACs, TMs, nasal/oral mucosa,  teeth, gingiva, and oropharynx;     NECK: trachea is midline; thyroid is non-palpable; carotid exam reveals no bruits;     RESPIRATORY: normal respiratory rate and pattern with no distress; normal breath sounds with no rales, rhonchi, wheezes or rubs;     CARDIOVASCULAR: normal rate; rhythm is regular;  no systolic murmur; no edema;     GASTROINTESTINAL: nontender; rectal exam: normal tone; no masses; no hemorrhoids;     LYMPHATIC: no enlargement of cervical or facial nodes; no supraclavicular nodes;     MUSCULOSKELETAL: normal gait; normal overall tone     NEUROLOGIC: GROSSLT INTACT;     PSYCHIATRIC:  appropriate affect and demeanor; normal speech pattern; grossly normal memory;         Assessment:         H81.10   Benign paroxysmal vertigo, unspecified ear   PROBABLE     I10   Essential (primary) hypertension       427.31   Paroxysmal atrial fibrillation           Plan:         Benign paroxysmal vertigo, unspecified ear        MEDICATIONS: (no change to current medication regimen)     RECOMMENDATIONS given include: rest whenever possible while symptoms persist and use caution when driving.      FOLLOW-UP: Schedule a follow-up appointment in 4 weeks.      CONSIDER MRI, EEG, CAROTID DOPPLER, ENT EVAL.  WILL GET ER NOTES         Essential (primary) hypertension        MEDICATIONS: (no change to current medication regimen)         Paroxysmal atrial fibrillation        PER CARDIOLOGY, STABLE AT THIS TIME             Patient Recommendations:        For  Benign paroxysmal vertigo, unspecified ear:    Get plenty of rest. Use caution when driving.  Schedule a follow-up visit in 4 weeks.          For  Paroxysmal atrial fibrillation:    I also recommend PER CARDIOLOGY, STABLE AT THIS TIME.              Charge Capture:         Primary Diagnosis:     H81.10  Benign paroxysmal vertigo, unspecified ear           Orders:      [id number]  Office/outpatient visit; established patient, level 4  (In-House)              I10  Essential  (primary) hypertension     427.31  Paroxysmal atrial fibrillation

## 2021-05-18 NOTE — PROGRESS NOTES
Xavier Polanco  1950     Office/Outpatient Visit    Visit Date: Mon, Nov 30, 2020 11:05 am    Provider: Esvin Gilliam MD (Assistant: Lily Yañez LPN)    Location: [facility]        Electronically signed by Esvin Gilliam MD on  11/30/2020 01:09:29 PM                             Subjective:        CC: Francisco is a 70 year old White male.  This is a follow-up visit.          HPI:           Francisco presents with essential (primary) hypertension.  This was first diagnosed several years ago.  His current cardiac medication regimen includes an angiotensin receptor blocker.  Compliance with treatment has been good.  He is tolerating the medication well without side effects.  He has not kept a blood pressure diary, but states that pressures have been too high.  ACEI MADE HIM COUGH           Concerning pure hypercholesterolemia (mild), current treatment includes diet.  Compliance with treatment has been fair.  Most recent lab tests include Total Cholesterol:  183 (mg/dL) (02/26/2019), HDL:  57 (mg/dL) (02/26/2019), Triglycerides:  80 (mg/dL) (02/26/2019), LDL:  110 (mg/dL) (02/26/2019).            Concerning anxiety disorder, unspecified, his symptom complex includes apprehension.  Current treatment includes an antidepressant and BuSpar.  A LITTLE WORSE SINCE COVID     ROS:     CONSTITUTIONAL:  Negative for chills and fever.      E/N/T:  Negative for ear pain, nasal congestion and sore throat.      CARDIOVASCULAR:  Negative for chest pain, palpitations and pedal edema.      RESPIRATORY:  Negative for recent cough and dyspnea.      GASTROINTESTINAL:  Negative for abdominal pain, anorexia, constipation, diarrhea, nausea and vomiting.      NEUROLOGICAL:  Negative for dizziness, headaches and RECENT FALLING.          Past Medical History / Family History / Social History:         Last Reviewed on 11/30/2020 11:31 AM by Esvin Gilliam    Past Medical History:             PREVENTIVE  HEALTH MAINTENANCE             EVALUATION FOR AORTIC ANEURYSM: was last done  with normal results     COLORECTAL CANCER SCREENING: Up to date (colonoscopy q10y; sigmoidoscopy q5y; Cologuard q3y) was last done , Results are in chart; colonoscopy with normal results;      DENTAL CLEANIN19     EYE EXAM: 19     Hepatitis C Medicare Screening: was last done      PSA: was last done 2019 with normal results         PAST MEDICAL HISTORY             CURRENT MEDICAL PROVIDERS:    Cardiologist    Pulmonologist             ADVANCE DIRECTIVES: Living will         Surgical History:         Arthroscopy: left shoulder;     Cataract Removal: bilateral;     Fracture Repair: L/R ANKLE. PELVIS, L CLAVICLE, L HUMERUS;;;     Laminectomy: cervical region;         Family History:     Father: Cause of death was cancer     Mother:      Brother(s): 5 brother(s) total; 1     ; Positive for Skin Cancer ( Melanoma );     Sister(s): 4 sister(s) total     Son(s): 1 son(s) total     Daughter(s): 5 daughter(s) total         Social History:     Occupation:    Retired         Tobacco/Alcohol/Supplements:     Last Reviewed on 2020 11:31 AM by Esvin Gilliam    Tobacco: He has a past history of cigar smoking. Quit Date:  17         Alcohol: Frequency: Daily When he drinks, the average quantity of alcohol is 3 drinks.   He typically consumes beer.      Caffeine:  He admits to consuming caffeine via coffee ( 1 serving per day ).          Substance Abuse History:     Last Reviewed on 2020 11:31 AM by Esvin Gilliam    NEGATIVE         Mental Health History:     Last Reviewed on 2020 07:54 PM by Panda Nicole        Communicable Diseases (eg STDs):     Last Reviewed on 2020 07:54 PM by Panda Nicole        Current Problems:     Last Reviewed on 2020 11:31 AM by Esvin Gilliam    Pure hypercholesterolemia (mild)    Primary generalized  (osteo)arthritis    Anxiety disorder, unspecified    Other long term (current) drug therapy    Diverticulosis    Allergies    Paroxysmal atrial fibrillation    Obstructive sleep apnea (cannot tolerate CPAP)    Diarrhea, unspecified        Immunizations:     influenza, high-dose, quadrivalent (FLUZONE HIGH-DOSE QUAD 2020-21) 10/22/2020    Prevnar 13 (Pneumococcal PCV 13) 2/2/2018    Fluzone Quadrivalent (3+ years) 10/21/2016    Fluzone High-Dose pf (>=65 yr) 10/31/2017    Fluzone High-Dose pf (>=65 yr) 11/14/2018    Fluzone High-Dose pf (>=65 yr) 10/7/2019    PNEUMOVAX 23 (Pneumococcal PPV23) 2/26/2019        Allergies:     Last Reviewed on 11/30/2020 11:31 AM by Esvin Gilliam    No Known Allergies.        Current Medications:     Last Reviewed on 11/30/2020 11:31 AM by Esvin Gilliam    multivitamin     aspirin 81 mg oral tablet, delayed release (enteric coated) [take 1 tablet (81 mg) by oral route once daily]    Tramadol 50 mg oral tablet [1 tab bid. prn]    busPIRone 10 mg oral tablet [TAKE ONE TABLET BY MOUTH TWICE A DAY AS NEEDED]    Diclofenac Sodium 75 mg oral tablet, delayed release (enteric coated) [1 tab bid]    amiodarone 200 mg oral tablet [1 tab daily ]    PARoxetine HCl 10 mg oral tablet [TAKE ONE TABLET BY MOUTH DAILY]    cyclobenzaprine 10 mg oral tablet [take 1 tablet (10 mg) by oral route 3 times per day as needed]    losartan 50 mg oral tablet [take 1 tablet (50 mg) by oral route daily]        Objective:        Vitals:         Current: 11/30/2020 11:15:27 AM    Ht:  5 ft, 5 in;  Wt: 229.4 lbs;  BMI: 38.2T: 97 F (temporal);  BP: 160/63 mm Hg (left arm, sitting);  P: 58 bpm (left arm (BP Cuff), sitting);  sCr: 1.15 mg/dL;  GFR: 61.97        Exams:     PHYSICAL EXAM:     GENERAL: Vitals recorded well developed, well nourished;  no apparent distress;     NECK: trachea is midline; thyroid is non-palpable;     RESPIRATORY: normal respiratory rate and pattern with no distress; normal  breath sounds with no rales, rhonchi, wheezes or rubs;     CARDIOVASCULAR: normal rate; rhythm is regular;  no systolic murmur;    Peripheral Pulses: dorsalis pedis: 2+ L and 2+ R;  no edema;     GASTROINTESTINAL: nontender;     LYMPHATIC: no enlargement of cervical or facial nodes;     NEUROLOGIC: GROSSLT INTACT;     PSYCHIATRIC:  appropriate affect and demeanor; normal speech pattern; grossly normal memory;         Assessment:         I10   Essential (primary) hypertension       E78.0   Pure hypercholesterolemia (mild)       F41.9   Anxiety disorder, unspecified       Z12.5   Encounter for screening for malignant neoplasm of prostate           ORDERS:         Meds Prescribed:       [Refilled] losartan 100 mg oral tablet [take 1 tablet (100 mg) by oral route once daily], #90 (ninety) tablets, Refills: 1 (one)       [Refilled] PARoxetine HCl 10 mg oral tablet [TAKE ONE TABLET BY MOUTH DAILY], #90 (ninety) tablets, Refills: 1 (one)         Radiology/Test Orders:       3017F  Colorectal CA screen results documented and reviewed (PV)  (In-House)              Lab Orders:       31989  BDCB2 - [facility] CBC w/o diff  (Send-Out)            69086  COMP [facility] Comp. Metabolic Panel  (Send-Out)            80193  TSH - [facility] TSH  (Send-Out)            48922  LPDP - [facility] Lipid Panel  (Send-Out)            *  [facility] Medicare screening PSA  (Send-Out)            APPTO  Appointment need  (In-House)              Other Orders:       1101F  Pt screen for fall risk; document no falls in past year or only 1 fall w/o injury in past year (LOGAN)  (In-House)                      Plan:         Essential (primary) hypertension    LABORATORY:  Labs ordered to be performed today include CBC W/O DIFF, Comprehensive metabolic panel, and TSH.  MIPS Has had no falls or only one fall without injury in the past year Vaccines Flu and Pneumonia updated in Shot record Colorectal Cancer Screening is up to date and the results are in the chart     FOLLOW-UP:  Schedule a follow-up visit in 6 months.:.      MEDICATIONS: I will change the dose of his an angiotensin II receptor blocker.      RECOMMENDATIONS given include: perform routine monitoring of blood pressure with home blood pressure cuff.            Prescriptions:       [Refilled] losartan 100 mg oral tablet [take 1 tablet (100 mg) by oral route once daily], #90 (ninety) tablets, Refills: 1 (one)           Orders:       66985  BDCB2 - [facility] CBC w/o diff  (Send-Out)            85423  COMP - [facility] Comp. Metabolic Panel  (Send-Out)            84126  TSH [facility] TSH  (Send-Out)            APPTO  Appointment need  (In-House)            1101F  Pt screen for fall risk; document no falls in past year or only 1 fall w/o injury in past year (LOGAN)  (In-House)            3017F  Colorectal CA screen results documented and reviewed (PV)  (In-House)              Pure hypercholesterolemia (mild)    LABORATORY:  Labs ordered to be performed today include lipid panel.            Orders:       [id number]  [facility] Lipid Panel  (Send-Out)              Anxiety disorder, unspecified          Prescriptions:       [Refilled] PARoxetine HCl 10 mg oral tablet [TAKE ONE TABLET BY MOUTH DAILY], #90 (ninety) tablets, Refills: 1 (one)         Encounter for screening for malignant neoplasm of prostate    LABORATORY:  Labs ordered to be performed today include PSA Screening Medicare patients.            Orders:       *  [facility] Medicare screening PSA  (Send-Out)                  Patient Recommendations:        For  Essential (primary) hypertension:    Schedule a follow-up visit in 6 months.                APPOINTMENT INFORMATION:        Monday Tuesday Wednesday Thursday Friday Saturday Sunday            Time:___________________AM  PM   Date:_____________________ Begin monitoring your blood pressure by brief nurse visits at our office, a home blood pressure monitor, or by checking on the machines in pharmacies or stores.  Keep a log of the readings.               Charge Capture:         Primary Diagnosis:     I10  Essential (primary) hypertension           Orders:      77474  Office/outpatient visit; established patient, level 4  (In-House)            APPTO  Appointment need  (In-House)            1101F  Pt screen for fall risk; document no falls in past year or only 1 fall w/o injury in past year (LOGAN)  (In-House)            3017F  Colorectal CA screen results documented and reviewed (PV)  (In-House)              E78.0  Pure hypercholesterolemia (mild)     F41.9  Anxiety disorder, unspecified     Z12.5  Encounter for screening for malignant neoplasm of prostate
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
## 2021-07-01 VITALS
SYSTOLIC BLOOD PRESSURE: 132 MMHG | TEMPERATURE: 98.2 F | BODY MASS INDEX: 39.45 KG/M2 | HEART RATE: 61 BPM | DIASTOLIC BLOOD PRESSURE: 71 MMHG | WEIGHT: 236.8 LBS | HEIGHT: 65 IN

## 2021-07-01 VITALS
HEIGHT: 65 IN | HEART RATE: 60 BPM | BODY MASS INDEX: 39.09 KG/M2 | DIASTOLIC BLOOD PRESSURE: 56 MMHG | WEIGHT: 234.6 LBS | TEMPERATURE: 98.4 F | SYSTOLIC BLOOD PRESSURE: 144 MMHG

## 2021-07-01 VITALS
BODY MASS INDEX: 38.65 KG/M2 | HEART RATE: 53 BPM | TEMPERATURE: 98.2 F | DIASTOLIC BLOOD PRESSURE: 87 MMHG | HEIGHT: 65 IN | SYSTOLIC BLOOD PRESSURE: 129 MMHG | WEIGHT: 232 LBS

## 2021-07-01 VITALS
TEMPERATURE: 98.2 F | DIASTOLIC BLOOD PRESSURE: 72 MMHG | HEART RATE: 55 BPM | BODY MASS INDEX: 38.04 KG/M2 | HEIGHT: 65 IN | WEIGHT: 228.3 LBS | SYSTOLIC BLOOD PRESSURE: 136 MMHG

## 2021-07-01 VITALS
TEMPERATURE: 98.3 F | HEIGHT: 65 IN | WEIGHT: 241.7 LBS | HEART RATE: 85 BPM | DIASTOLIC BLOOD PRESSURE: 78 MMHG | BODY MASS INDEX: 40.27 KG/M2 | SYSTOLIC BLOOD PRESSURE: 134 MMHG

## 2021-07-01 VITALS
HEART RATE: 54 BPM | BODY MASS INDEX: 38.92 KG/M2 | HEIGHT: 65 IN | TEMPERATURE: 98 F | SYSTOLIC BLOOD PRESSURE: 137 MMHG | DIASTOLIC BLOOD PRESSURE: 65 MMHG | WEIGHT: 233.6 LBS

## 2021-07-02 VITALS
HEART RATE: 73 BPM | DIASTOLIC BLOOD PRESSURE: 50 MMHG | WEIGHT: 213.4 LBS | TEMPERATURE: 98.8 F | HEIGHT: 65 IN | BODY MASS INDEX: 35.56 KG/M2 | SYSTOLIC BLOOD PRESSURE: 149 MMHG

## 2021-07-02 VITALS
HEIGHT: 65 IN | HEART RATE: 59 BPM | DIASTOLIC BLOOD PRESSURE: 62 MMHG | WEIGHT: 221.6 LBS | SYSTOLIC BLOOD PRESSURE: 127 MMHG | BODY MASS INDEX: 36.92 KG/M2 | TEMPERATURE: 98 F

## 2021-07-02 VITALS
SYSTOLIC BLOOD PRESSURE: 140 MMHG | DIASTOLIC BLOOD PRESSURE: 62 MMHG | BODY MASS INDEX: 39.25 KG/M2 | HEART RATE: 57 BPM | WEIGHT: 235.6 LBS | HEIGHT: 65 IN | TEMPERATURE: 98.6 F

## 2021-07-02 VITALS
SYSTOLIC BLOOD PRESSURE: 160 MMHG | HEIGHT: 65 IN | HEART RATE: 58 BPM | DIASTOLIC BLOOD PRESSURE: 63 MMHG | BODY MASS INDEX: 38.22 KG/M2 | TEMPERATURE: 97 F | WEIGHT: 229.4 LBS

## 2021-07-02 VITALS
HEART RATE: 55 BPM | HEIGHT: 65 IN | WEIGHT: 234.4 LBS | BODY MASS INDEX: 39.05 KG/M2 | DIASTOLIC BLOOD PRESSURE: 60 MMHG | SYSTOLIC BLOOD PRESSURE: 139 MMHG | TEMPERATURE: 98 F

## 2021-07-02 VITALS
HEIGHT: 65 IN | TEMPERATURE: 98.6 F | DIASTOLIC BLOOD PRESSURE: 49 MMHG | SYSTOLIC BLOOD PRESSURE: 124 MMHG | BODY MASS INDEX: 38.12 KG/M2 | WEIGHT: 228.8 LBS | HEART RATE: 59 BPM

## 2021-07-02 VITALS
BODY MASS INDEX: 38.15 KG/M2 | HEART RATE: 60 BPM | TEMPERATURE: 96 F | DIASTOLIC BLOOD PRESSURE: 64 MMHG | WEIGHT: 229 LBS | HEIGHT: 65 IN | SYSTOLIC BLOOD PRESSURE: 151 MMHG

## 2021-07-02 VITALS
TEMPERATURE: 98.5 F | HEART RATE: 57 BPM | BODY MASS INDEX: 39.42 KG/M2 | WEIGHT: 236.6 LBS | SYSTOLIC BLOOD PRESSURE: 144 MMHG | DIASTOLIC BLOOD PRESSURE: 70 MMHG | HEIGHT: 65 IN

## 2021-07-09 ENCOUNTER — TELEPHONE (OUTPATIENT)
Dept: FAMILY MEDICINE CLINIC | Age: 71
End: 2021-07-09

## 2021-07-09 RX ORDER — BUSPIRONE HYDROCHLORIDE 10 MG/1
TABLET ORAL
Qty: 60 TABLET | Refills: 0 | Status: SHIPPED | OUTPATIENT
Start: 2021-07-09 | End: 2021-08-31
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
## 2021-07-27 RX ORDER — DICLOFENAC SODIUM 75 MG/1
TABLET, DELAYED RELEASE ORAL
Qty: 60 TABLET | Refills: 0 | Status: SHIPPED | OUTPATIENT
Start: 2021-07-27 | End: 2021-09-14

## 2021-08-02 ENCOUNTER — OFFICE VISIT (OUTPATIENT)
Dept: FAMILY MEDICINE CLINIC | Age: 71
End: 2021-08-02

## 2021-08-02 VITALS
BODY MASS INDEX: 38.69 KG/M2 | SYSTOLIC BLOOD PRESSURE: 158 MMHG | DIASTOLIC BLOOD PRESSURE: 82 MMHG | HEIGHT: 65 IN | HEART RATE: 68 BPM | WEIGHT: 232.2 LBS | TEMPERATURE: 98.4 F

## 2021-08-02 DIAGNOSIS — M62.838 MUSCLE SPASM: Primary | ICD-10-CM

## 2021-08-02 PROCEDURE — 99213 OFFICE O/P EST LOW 20 MIN: CPT | Performed by: NURSE PRACTITIONER

## 2021-08-02 RX ORDER — PAROXETINE 10 MG/1
1 TABLET, FILM COATED ORAL DAILY
COMMUNITY
Start: 2021-06-02 | End: 2021-08-31

## 2021-08-02 RX ORDER — AMIODARONE HYDROCHLORIDE 200 MG/1
200 TABLET ORAL DAILY
COMMUNITY
Start: 2021-01-12 | End: 2022-01-07

## 2021-08-02 RX ORDER — TRAMADOL HYDROCHLORIDE 50 MG/1
1 TABLET ORAL 2 TIMES DAILY
COMMUNITY
Start: 2021-06-16 | End: 2021-10-06

## 2021-08-02 RX ORDER — CYCLOBENZAPRINE HCL 10 MG
10 TABLET ORAL 3 TIMES DAILY PRN
Qty: 30 TABLET | Refills: 0 | Status: SHIPPED | OUTPATIENT
Start: 2021-08-02 | End: 2021-09-08

## 2021-08-02 RX ORDER — ASPIRIN 81 MG/1
81 TABLET ORAL DAILY
COMMUNITY

## 2021-08-02 RX ORDER — VALSARTAN 160 MG/1
2 TABLET ORAL DAILY
COMMUNITY
Start: 2021-05-26 | End: 2021-09-08 | Stop reason: DRUGHIGH

## 2021-08-02 NOTE — PATIENT INSTRUCTIONS
Acute Back Pain, Adult  Acute back pain is sudden and usually short-lived. It is often caused by an injury to the muscles and tissues in the back. The injury may result from:  · A muscle or ligament getting overstretched or torn (strained). Ligaments are tissues that connect bones to each other. Lifting something improperly can cause a back strain.  · Wear and tear (degeneration) of the spinal disks. Spinal disks are circular tissue that provide cushioning between the bones of the spine (vertebrae).  · Twisting motions, such as while playing sports or doing yard work.  · A hit to the back.  · Arthritis.  You may have a physical exam, lab tests, and imaging tests to find the cause of your pain. Acute back pain usually goes away with rest and home care.  Follow these instructions at home:  Managing pain, stiffness, and swelling  · Treatment may include medicines for pain and inflammation that are taken by mouth or applied to the skin, prescription pain medicine, or muscle relaxants. Take over-the-counter and prescription medicines only as told by your health care provider.  · Your health care provider may recommend applying ice during the first 24-48 hours after your pain starts. To do this:  ? Put ice in a plastic bag.  ? Place a towel between your skin and the bag.  ? Leave the ice on for 20 minutes, 2-3 times a day.  · If directed, apply heat to the affected area as often as told by your health care provider. Use the heat source that your health care provider recommends, such as a moist heat pack or a heating pad.  ? Place a towel between your skin and the heat source.  ? Leave the heat on for 20-30 minutes.  ? Remove the heat if your skin turns bright red. This is especially important if you are unable to feel pain, heat, or cold. You have a greater risk of getting burned.  Activity    · Do not stay in bed. Staying in bed for more than 1-2 days can delay your recovery.  · Sit up and stand up straight. Avoid  leaning forward when you sit or hunching over when you stand.  ? If you work at a desk, sit close to it so you do not need to lean over. Keep your chin tucked in. Keep your neck drawn back, and keep your elbows bent at a 90-degree angle (right angle).  ? Sit high and close to the steering wheel when you drive. Add lower back (lumbar) support to your car seat, if needed.  · Take short walks on even surfaces as soon as you are able. Try to increase the length of time you walk each day.  · Do not sit, drive, or  one place for more than 30 minutes at a time. Sitting or standing for long periods of time can put stress on your back.  · Do not drive or use heavy machinery while taking prescription pain medicine.  · Use proper lifting techniques. When you bend and lift, use positions that put less stress on your back:  ? Bend your knees.  ? Keep the load close to your body.  ? Avoid twisting.  · Exercise regularly as told by your health care provider. Exercising helps your back heal faster and helps prevent back injuries by keeping muscles strong and flexible.  · Work with a physical therapist to make a safe exercise program, as recommended by your health care provider. Do any exercises as told by your physical therapist.  Lifestyle  · Maintain a healthy weight. Extra weight puts stress on your back and makes it difficult to have good posture.  · Avoid activities or situations that make you feel anxious or stressed. Stress and anxiety increase muscle tension and can make back pain worse. Learn ways to manage anxiety and stress, such as through exercise.  General instructions  · Sleep on a firm mattress in a comfortable position. Try lying on your side with your knees slightly bent. If you lie on your back, put a pillow under your knees.  · Follow your treatment plan as told by your health care provider. This may include:  ? Cognitive or behavioral therapy.  ? Acupuncture or massage therapy.  ? Meditation or  yoga.  Contact a health care provider if:  · You have pain that is not relieved with rest or medicine.  · You have increasing pain going down into your legs or buttocks.  · Your pain does not improve after 2 weeks.  · You have pain at night.  · You lose weight without trying.  · You have a fever or chills.  Get help right away if:  · You develop new bowel or bladder control problems.  · You have unusual weakness or numbness in your arms or legs.  · You develop nausea or vomiting.  · You develop abdominal pain.  · You feel faint.  Summary  · Acute back pain is sudden and usually short-lived.  · Use proper lifting techniques. When you bend and lift, use positions that put less stress on your back.  · Take over-the-counter and prescription medicines and apply heat or ice as directed by your health care provider.  This information is not intended to replace advice given to you by your health care provider. Make sure you discuss any questions you have with your health care provider.  Document Revised: 12/11/2020 Document Reviewed: 08/01/2018  Elsevier Patient Education © 2021 [employer]

## 2021-08-02 NOTE — PROGRESS NOTES
"Chief Complaint  Back Pain (complaints of issues with back and hips \"drawing\" X 3 months )    Subjective          Xavier Polanco presents to [facility]  He reports that the his back and whole body is \"drawing up\" which didn't start until after his 2nd COVID vaccine in April.  It occurs daily, mostly in the morning and at night. He denies numbness and tingling.  He denies bowel/bladder incontinence.   He has been taking tramadol for a long time, and has not noticed that it helps his symptoms.  He has noticed that laying at night makes it worse.  He reports that it will be hard to walk in the morning when he first gets up due to this \"drawing up\" of his body.  He does have a history of cervical spinal surgery April 2020.    Blood pressure is high in office today.  He has had some switching of his BP medication.  He is taking valsartan 160 mg BID.      Objective   Vital Signs:   /82 (BP Location: Left arm, Patient Position: Sitting)   Pulse 68   Temp 98.4 °F (36.9 °C) (Oral)   Ht 165.1 cm (65\")   Wt 105 kg (232 lb 3.2 oz)   BMI 38.64 kg/m²     Physical Exam  Constitutional:       General: He is not in acute distress.     Appearance: Normal appearance. He is obese.   HENT:      Head: Normocephalic.   Eyes:      Pupils: Pupils are equal, round, and reactive to light.      Visual Fields: Right eye visual fields normal and left eye visual fields normal.   Neck:      Trachea: Trachea normal.   Cardiovascular:      Rate and Rhythm: Normal rate and regular rhythm.      Heart sounds: Normal heart sounds.   Pulmonary:      Effort: Pulmonary effort is normal.      Breath sounds: Normal breath sounds and air entry.   Musculoskeletal:         General: No swelling, tenderness, deformity or signs of injury. Normal range of motion.      Right lower leg: No edema.      Left lower leg: No edema.   Skin:     General: Skin is warm and dry.   Neurological:      Mental Status: He is alert and " "oriented to person, place, and time.   Psychiatric:         Mood and Affect: Mood and affect normal.         Behavior: Behavior normal.         Thought Content: Thought content normal.        Result Review :   The following data was reviewed by: KIARA Ruiz on 08/02/2021:  Basic Metabolic Panel (04/28/2021 12:20)               Assessment and Plan    Diagnoses and all orders for this visit:    1. Muscle spasm (Primary)  -     cyclobenzaprine (FLEXERIL) 10 MG tablet; Take 1 tablet by mouth 3 (Three) Times a Day As Needed for Muscle Spasms.  Dispense: 30 tablet; Refill: 0    He describes his symptoms as \"drawing up.\"  Will treat him for muscle spasms.  He is to keep his f/u with Dr. Gilliam.    Follow Up   Return for Next scheduled follow up.  Patient was given instructions and counseling regarding his condition or for health maintenance advice. Please see specific information pulled into the AVS if appropriate.       "

## 2021-08-31 ENCOUNTER — TELEPHONE (OUTPATIENT)
Dept: FAMILY MEDICINE CLINIC | Age: 71
End: 2021-08-31

## 2021-08-31 RX ORDER — PAROXETINE 10 MG/1
TABLET, FILM COATED ORAL
Qty: 90 TABLET | Refills: 3 | Status: SHIPPED | OUTPATIENT
Start: 2021-08-31 | End: 2021-09-01 | Stop reason: SDUPTHER

## 2021-08-31 RX ORDER — BUSPIRONE HYDROCHLORIDE 10 MG/1
TABLET ORAL
Qty: 60 TABLET | Refills: 0 | Status: SHIPPED | OUTPATIENT
Start: 2021-08-31 | End: 2021-09-01 | Stop reason: SDUPTHER

## 2021-09-01 RX ORDER — BUSPIRONE HYDROCHLORIDE 10 MG/1
10 TABLET ORAL 2 TIMES DAILY PRN
Qty: 180 TABLET | Refills: 3 | Status: SHIPPED | OUTPATIENT
Start: 2021-09-01 | End: 2021-09-08 | Stop reason: DRUGHIGH

## 2021-09-01 RX ORDER — PAROXETINE 10 MG/1
10 TABLET, FILM COATED ORAL DAILY
Qty: 90 TABLET | Refills: 3 | Status: SHIPPED | OUTPATIENT
Start: 2021-09-01 | End: 2021-09-08 | Stop reason: DRUGHIGH

## 2021-09-08 ENCOUNTER — OFFICE VISIT (OUTPATIENT)
Dept: FAMILY MEDICINE CLINIC | Age: 71
End: 2021-09-08

## 2021-09-08 ENCOUNTER — LAB (OUTPATIENT)
Dept: LAB | Facility: HOSPITAL | Age: 71
End: 2021-09-08

## 2021-09-08 VITALS
HEART RATE: 70 BPM | TEMPERATURE: 98.2 F | WEIGHT: 233.4 LBS | HEIGHT: 65 IN | DIASTOLIC BLOOD PRESSURE: 76 MMHG | BODY MASS INDEX: 38.89 KG/M2 | SYSTOLIC BLOOD PRESSURE: 177 MMHG

## 2021-09-08 DIAGNOSIS — I48.0 PAROXYSMAL ATRIAL FIBRILLATION (HCC): ICD-10-CM

## 2021-09-08 DIAGNOSIS — M15.9 GENERALIZED OSTEOARTHRITIS: ICD-10-CM

## 2021-09-08 DIAGNOSIS — F41.9 ANXIETY: ICD-10-CM

## 2021-09-08 DIAGNOSIS — I10 ESSENTIAL HYPERTENSION: Primary | ICD-10-CM

## 2021-09-08 DIAGNOSIS — E78.00 HIGH CHOLESTEROL: ICD-10-CM

## 2021-09-08 PROBLEM — G47.30 SLEEP APNEA: Status: ACTIVE | Noted: 2017-04-20

## 2021-09-08 PROBLEM — R79.89: Status: ACTIVE | Noted: 2021-09-08

## 2021-09-08 PROBLEM — R79.89: Status: RESOLVED | Noted: 2021-09-08 | Resolved: 2021-09-08

## 2021-09-08 LAB
ALBUMIN SERPL-MCNC: 4.3 G/DL (ref 3.5–5.2)
ALBUMIN/GLOB SERPL: 1.7 G/DL
ALP SERPL-CCNC: 80 U/L (ref 39–117)
ALT SERPL W P-5'-P-CCNC: 28 U/L (ref 1–41)
ANION GAP SERPL CALCULATED.3IONS-SCNC: 7.4 MMOL/L (ref 5–15)
AST SERPL-CCNC: 20 U/L (ref 1–40)
BILIRUB SERPL-MCNC: 0.5 MG/DL (ref 0–1.2)
BUN SERPL-MCNC: 15 MG/DL (ref 8–23)
BUN/CREAT SERPL: 13.4 (ref 7–25)
CALCIUM SPEC-SCNC: 9.1 MG/DL (ref 8.6–10.5)
CHLORIDE SERPL-SCNC: 99 MMOL/L (ref 98–107)
CHOLEST SERPL-MCNC: 176 MG/DL (ref 0–200)
CO2 SERPL-SCNC: 32.6 MMOL/L (ref 22–29)
CREAT SERPL-MCNC: 1.12 MG/DL (ref 0.76–1.27)
DEPRECATED RDW RBC AUTO: 52.8 FL (ref 37–54)
ERYTHROCYTE [DISTWIDTH] IN BLOOD BY AUTOMATED COUNT: 14.2 % (ref 12.3–15.4)
GFR SERPL CREATININE-BSD FRML MDRD: 65 ML/MIN/1.73
GLOBULIN UR ELPH-MCNC: 2.6 GM/DL
GLUCOSE SERPL-MCNC: 101 MG/DL (ref 65–99)
HCT VFR BLD AUTO: 40.6 % (ref 37.5–51)
HDLC SERPL-MCNC: 74 MG/DL (ref 40–60)
HGB BLD-MCNC: 13.1 G/DL (ref 13–17.7)
LDLC SERPL CALC-MCNC: 89 MG/DL (ref 0–100)
LDLC/HDLC SERPL: 1.2 {RATIO}
MCH RBC QN AUTO: 32.8 PG (ref 26.6–33)
MCHC RBC AUTO-ENTMCNC: 32.3 G/DL (ref 31.5–35.7)
MCV RBC AUTO: 101.5 FL (ref 79–97)
PLATELET # BLD AUTO: 241 10*3/MM3 (ref 140–450)
PMV BLD AUTO: 9.1 FL (ref 6–12)
POTASSIUM SERPL-SCNC: 4.4 MMOL/L (ref 3.5–5.2)
PROT SERPL-MCNC: 6.9 G/DL (ref 6–8.5)
RBC # BLD AUTO: 4 10*6/MM3 (ref 4.14–5.8)
SODIUM SERPL-SCNC: 139 MMOL/L (ref 136–145)
TRIGL SERPL-MCNC: 67 MG/DL (ref 0–150)
TSH SERPL DL<=0.05 MIU/L-ACNC: 2.01 UIU/ML (ref 0.27–4.2)
VLDLC SERPL-MCNC: 13 MG/DL (ref 5–40)
WBC # BLD AUTO: 7.95 10*3/MM3 (ref 3.4–10.8)

## 2021-09-08 PROCEDURE — 80061 LIPID PANEL: CPT | Performed by: FAMILY MEDICINE

## 2021-09-08 PROCEDURE — 36415 COLL VENOUS BLD VENIPUNCTURE: CPT | Performed by: FAMILY MEDICINE

## 2021-09-08 PROCEDURE — 84443 ASSAY THYROID STIM HORMONE: CPT | Performed by: FAMILY MEDICINE

## 2021-09-08 PROCEDURE — 99214 OFFICE O/P EST MOD 30 MIN: CPT | Performed by: FAMILY MEDICINE

## 2021-09-08 PROCEDURE — 80053 COMPREHEN METABOLIC PANEL: CPT | Performed by: FAMILY MEDICINE

## 2021-09-08 PROCEDURE — 85027 COMPLETE CBC AUTOMATED: CPT | Performed by: FAMILY MEDICINE

## 2021-09-08 RX ORDER — PAROXETINE HYDROCHLORIDE 20 MG/1
20 TABLET, FILM COATED ORAL EVERY MORNING
Qty: 90 TABLET | Refills: 1 | Status: SHIPPED | OUTPATIENT
Start: 2021-09-08 | End: 2022-03-08

## 2021-09-08 RX ORDER — VALSARTAN 320 MG/1
320 TABLET ORAL DAILY
COMMUNITY
Start: 2021-07-16 | End: 2022-11-09

## 2021-09-08 RX ORDER — BUSPIRONE HYDROCHLORIDE 15 MG/1
15 TABLET ORAL 2 TIMES DAILY
Qty: 180 TABLET | Refills: 1 | Status: SHIPPED | OUTPATIENT
Start: 2021-09-08 | End: 2022-07-19 | Stop reason: SDUPTHER

## 2021-09-08 NOTE — PROGRESS NOTES
Chief Complaint  Hypertension    Subjective          Xavier Polanco presents to [facility]  --BP HAS BEEN HIGHER LATELY.  HIS CARDIOLOGIST INCREASED HIS ARB DOSE A FEW DAYS AGO.  NO CHANGE IN BP READINGS THUS FAR  --LAST LIPID PANEL WAS OK.  NO ISSUES WITH THE STATIN.    --CHRONIC NSAIDS ARE DOING A FAIR JOB ON HIS CHRONIC JOINT PAINS.    --HE IS NOT AWARE OF ANY IRREGULAR HEART BEAT ON THE CHRONIC MEDS FOR HIS A. FIB  --HIS ANXIETY HAS BEEN WORSE DUE TO RECENT EVENTS AND TO THE BP BEING HIGHER.  WOULD LIKE AN INCREASE IN HIS MEDS           No Known Allergies     Health Maintenance Due   Topic Date Due   • COLORECTAL CANCER SCREENING  Never done   • TDAP/TD VACCINES (1 - Tdap) Never done   • ZOSTER VACCINE (1 of 2) Never done   • Pneumococcal Vaccine 65+ (1 of 2 - PPSV23) 03/30/2018   • HEPATITIS C SCREENING  Never done   • ANNUAL WELLNESS VISIT  Never done        Current Outpatient Medications on File Prior to Visit   Medication Sig   • amiodarone (PACERONE) 200 MG tablet Take 200 mg by mouth Daily.   • aspirin 81 MG EC tablet Take 81 mg by mouth Daily.   • diclofenac (VOLTAREN) 75 MG EC tablet TAKE ONE TABLET BY MOUTH TWICE A DAY   • traMADol (ULTRAM) 50 MG tablet Take 1 tablet by mouth 2 (Two) Times a Day.   • valsartan (DIOVAN) 320 MG tablet Take 320 mg by mouth Daily.   • [DISCONTINUED] busPIRone (BUSPAR) 10 MG tablet Take 1 tablet by mouth 2 (Two) Times a Day As Needed (anxiety).   • [DISCONTINUED] PARoxetine (PAXIL) 10 MG tablet Take 1 tablet by mouth Daily.   • [DISCONTINUED] cyclobenzaprine (FLEXERIL) 10 MG tablet Take 1 tablet by mouth 3 (Three) Times a Day As Needed for Muscle Spasms.   • [DISCONTINUED] valsartan (DIOVAN) 160 MG tablet Take 2 tablets by mouth Daily.     No current facility-administered medications on file prior to visit.       Immunization History   Administered Date(s) Administered   • COVID-19 (MODERNA) 03/25/2021, 04/20/2021   • Pneumococcal Conjugate  "13-Valent (PCV13) 02/02/2018       Review of Systems   Constitutional: Negative for appetite change, chills, fatigue and fever.   HENT: Negative for ear pain, rhinorrhea and sore throat.    Eyes: Negative for blurred vision and discharge.   Respiratory: Negative for cough and shortness of breath.    Cardiovascular: Negative for chest pain, palpitations and leg swelling.   Gastrointestinal: Negative for abdominal pain, nausea and vomiting.   Musculoskeletal: Positive for arthralgias. Negative for myalgias.   Neurological: Negative for headache.        Objective     /76 (BP Location: Left arm, Patient Position: Sitting, Cuff Size: Large Adult)   Pulse 70   Temp 98.2 °F (36.8 °C) (Oral)   Ht 165.1 cm (65\")   Wt 106 kg (233 lb 6.4 oz)   BMI 38.84 kg/m²       Physical Exam  Vitals and nursing note reviewed.   Constitutional:       General: He is not in acute distress.     Appearance: Normal appearance.   Cardiovascular:      Rate and Rhythm: Normal rate and regular rhythm.      Heart sounds: No murmur heard.     Pulmonary:      Effort: Pulmonary effort is normal.      Breath sounds: Normal breath sounds.   Abdominal:      Palpations: Abdomen is soft.      Tenderness: There is no abdominal tenderness.   Musculoskeletal:         General: No swelling.      Cervical back: Neck supple.   Lymphadenopathy:      Cervical: No cervical adenopathy.   Neurological:      General: No focal deficit present.      Mental Status: He is alert.      Cranial Nerves: No cranial nerve deficit.      Coordination: Coordination normal.      Gait: Gait normal.   Psychiatric:         Mood and Affect: Mood normal.         Behavior: Behavior normal.         Result Review :                             Assessment and Plan      Diagnoses and all orders for this visit:    1. Essential hypertension (Primary)  Assessment & Plan:  Hypertension is worsening.  Continue current treatment regimen.  Dietary sodium restriction.  Weight loss.  Regular " aerobic exercise.  Continue current medications.  Blood pressure will be reassessed in 3 months   NOT AT GOAL.  CONTINUE NEW MED DOSING PER CARDIOLOGY AND MONITOR BP AT HOME.  F/U WITH CARDIOLOGY NEXT MONTH AS PLANNED .    Orders:  -     Comprehensive Metabolic Panel  -     CBC (No Diff)  -     TSH    2. High cholesterol  Assessment & Plan:  Lipid abnormalities are improving with treatment.  Nutritional counseling was provided.  Lipids will be reassessed in 3 months.    Orders:  -     Lipid Panel    3. Paroxysmal atrial fibrillation (CMS/HCC)  Assessment & Plan:  CURRENTLY UNDER GOOD CONTROL.  CONTINUE SAME MEDS AND F/U WITH CARDIOLOGY NEXT MONTH AS PLANNED       4. Generalized osteoarthritis  Assessment & Plan:  FAIR CONTROL OF SYMPTOMS ON CURRENT MEDS.  CONTINUE SAME DOSING AND WILL REEVALUATE AT NEXT VISIT.  WOULD ALSO CONSIDER THE LONG-TERM USE OF NSAIDS AS A CONTRIBUTOR TO HIS BLOOD PRESSURE ISSUES       5. Anxiety  Assessment & Plan:  POOR CONTROL OF SYMPTOMS CURRENTLY.  WILL INCREASE BOTH THE BUSPAR AND THE PAXIL.  CONSIDER DIFFERENT AGENTS OR A MENTAL HEALTH EVALUATION.        Other orders  -     PARoxetine (Paxil) 20 MG tablet; Take 1 tablet by mouth Every Morning.  Dispense: 90 tablet; Refill: 1  -     busPIRone (BUSPAR) 15 MG tablet; Take 1 tablet by mouth 2 (Two) Times a Day for 180 days.  Dispense: 180 tablet; Refill: 1          Follow Up     Return in about 3 months (around 12/8/2021).    Patient was given instructions and counseling regarding his condition or for health maintenance advice. Please see specific information pulled into the AVS if appropriate.

## 2021-09-08 NOTE — ASSESSMENT & PLAN NOTE
Hypertension is worsening.  Continue current treatment regimen.  Dietary sodium restriction.  Weight loss.  Regular aerobic exercise.  Continue current medications.  Blood pressure will be reassessed in 3 months   NOT AT GOAL.  CONTINUE NEW MED DOSING PER CARDIOLOGY AND MONITOR BP AT HOME.  F/U WITH CARDIOLOGY NEXT MONTH AS PLANNED .   PT HAS A CRICK IN HER NECK AND CANNOT MOVE.  PLEASE ADVISE.

## 2021-09-08 NOTE — ASSESSMENT & PLAN NOTE
FAIR CONTROL OF SYMPTOMS ON CURRENT MEDS.  CONTINUE SAME DOSING AND WILL REEVALUATE AT NEXT VISIT.  WOULD ALSO CONSIDER THE LONG-TERM USE OF NSAIDS AS A CONTRIBUTOR TO HIS BLOOD PRESSURE ISSUES

## 2021-09-08 NOTE — ASSESSMENT & PLAN NOTE
POOR CONTROL OF SYMPTOMS CURRENTLY.  WILL INCREASE BOTH THE BUSPAR AND THE PAXIL.  CONSIDER DIFFERENT AGENTS OR A MENTAL HEALTH EVALUATION.

## 2021-09-10 DIAGNOSIS — M15.9 GENERALIZED OSTEOARTHRITIS: Primary | ICD-10-CM

## 2021-09-14 ENCOUNTER — TELEPHONE (OUTPATIENT)
Dept: FAMILY MEDICINE CLINIC | Age: 71
End: 2021-09-14

## 2021-09-14 RX ORDER — DICLOFENAC SODIUM 75 MG/1
75 TABLET, DELAYED RELEASE ORAL 2 TIMES DAILY
Qty: 60 TABLET | Refills: 0 | Status: SHIPPED | OUTPATIENT
Start: 2021-09-14 | End: 2021-10-28 | Stop reason: SDUPTHER

## 2021-09-14 NOTE — TELEPHONE ENCOUNTER
HUB TO READ  PT IS OUT OF MEDS AND NEEDS THEM REFILLED TO [facility]. PT SAID HE HAS BEEN WAITING SINCE LAST WEEK AND IS NOW OUT.

## 2021-09-14 NOTE — TELEPHONE ENCOUNTER
Rx Refill Note  Requested Prescriptions     Pending Prescriptions Disp Refills   • diclofenac (VOLTAREN) 75 MG EC tablet [Pharmacy Med Name: DICLOFENAC SOD EC 75 MG TAB] 60 tablet 0     Sig: TAKE ONE TABLET BY MOUTH TWICE A DAY      Last office visit with prescribing clinician: 9/8/2021      Next office visit with prescribing clinician: 12/08/21   Lab:  Comprehensive Metabolic Panel (09/08/2021 16:17)      Lily Vale LPN  09/14/21, 14:34 EDT

## 2021-10-04 DIAGNOSIS — M15.9 GENERALIZED OSTEOARTHRITIS: Primary | ICD-10-CM

## 2021-10-05 NOTE — TELEPHONE ENCOUNTER
Rx Refill Note  Requested Prescriptions     Pending Prescriptions Disp Refills   • traMADol (ULTRAM) 50 MG tablet [Pharmacy Med Name: traMADol HCL 50MG TABLET] 60 tablet      Sig: TAKE ONE TABLET BY MOUTH TWICE A DAY AS NEEDED      Last office visit with prescribing clinician: 9/8/2021      Next office visit with prescribing clinician: 12/8/2021     {TIP  Is Refill Pharmacy correct?yes  Shobha Tripp  10/05/21, 08:43 EDT

## 2021-10-06 RX ORDER — TRAMADOL HYDROCHLORIDE 50 MG/1
TABLET ORAL
Qty: 60 TABLET | Refills: 2 | Status: SHIPPED | OUTPATIENT
Start: 2021-10-06 | End: 2022-03-11

## 2021-10-27 DIAGNOSIS — M15.9 GENERALIZED OSTEOARTHRITIS: ICD-10-CM

## 2021-10-28 RX ORDER — DICLOFENAC SODIUM 75 MG/1
TABLET, DELAYED RELEASE ORAL
Qty: 180 TABLET | Refills: 0 | Status: SHIPPED | OUTPATIENT
Start: 2021-10-28 | End: 2022-04-21 | Stop reason: SDUPTHER

## 2021-10-28 NOTE — TELEPHONE ENCOUNTER
Rx Refill Note  Requested Prescriptions     Pending Prescriptions Disp Refills   • diclofenac (VOLTAREN) 75 MG EC tablet [Pharmacy Med Name: DICLOFENAC SOD EC 75 MG TAB] 60 tablet 0     Sig: TAKE ONE TABLET BY MOUTH TWICE A DAY      Last office visit with prescribing clinician: 9/8/2021      Next office visit with prescribing clinician: 12/8/2021   Kristie Bullock LPN  10/28/21, 11:57 EDT       LAST FILL-9/14/21    Lab Results   Component Value Date    GLUCOSE 101 (H) 09/08/2021    BUN 15 09/08/2021    CREATININE 1.12 09/08/2021    EGFRIFNONA 65 09/08/2021    BCR 13.4 09/08/2021    K 4.4 09/08/2021    CO2 32.6 (H) 09/08/2021    CALCIUM 9.1 09/08/2021    ALBUMIN 4.30 09/08/2021    LABIL2 1.5 02/04/2021    AST 20 09/08/2021    ALT 28 09/08/2021         OK TO REFILL?

## 2021-12-13 ENCOUNTER — OFFICE VISIT (OUTPATIENT)
Dept: FAMILY MEDICINE CLINIC | Age: 71
End: 2021-12-13

## 2021-12-13 VITALS
WEIGHT: 227.2 LBS | DIASTOLIC BLOOD PRESSURE: 59 MMHG | SYSTOLIC BLOOD PRESSURE: 146 MMHG | BODY MASS INDEX: 37.85 KG/M2 | HEIGHT: 65 IN | HEART RATE: 64 BPM | OXYGEN SATURATION: 96 %

## 2021-12-13 DIAGNOSIS — E78.00 ELEVATED CHOLESTEROL: ICD-10-CM

## 2021-12-13 DIAGNOSIS — E34.9 TESTOSTERONE DEFICIENCY: Primary | ICD-10-CM

## 2021-12-13 DIAGNOSIS — F41.9 ANXIETY: ICD-10-CM

## 2021-12-13 DIAGNOSIS — Z23 NEED FOR VACCINATION: ICD-10-CM

## 2021-12-13 DIAGNOSIS — M15.9 GENERALIZED OSTEOARTHRITIS: ICD-10-CM

## 2021-12-13 DIAGNOSIS — I10 ESSENTIAL HYPERTENSION: ICD-10-CM

## 2021-12-13 PROBLEM — G47.33 OBSTRUCTIVE SLEEP APNEA: Status: ACTIVE | Noted: 2017-04-20

## 2021-12-13 PROCEDURE — 99214 OFFICE O/P EST MOD 30 MIN: CPT | Performed by: FAMILY MEDICINE

## 2021-12-13 PROCEDURE — 90662 IIV NO PRSV INCREASED AG IM: CPT | Performed by: FAMILY MEDICINE

## 2021-12-13 PROCEDURE — G0008 ADMIN INFLUENZA VIRUS VAC: HCPCS | Performed by: FAMILY MEDICINE

## 2021-12-13 RX ORDER — TESTOSTERONE 10 MG/.5G
GEL, METERED TOPICAL
COMMUNITY
End: 2022-11-09

## 2021-12-13 RX ORDER — FUROSEMIDE 20 MG/1
TABLET ORAL
COMMUNITY
Start: 2021-11-15 | End: 2023-03-13

## 2021-12-13 RX ORDER — METOPROLOL SUCCINATE 25 MG/1
25 TABLET, EXTENDED RELEASE ORAL DAILY
COMMUNITY
Start: 2021-11-23 | End: 2022-11-23

## 2021-12-13 RX ORDER — DICLOFENAC SODIUM 75 MG/1
1 TABLET, DELAYED RELEASE ORAL 2 TIMES DAILY
COMMUNITY
End: 2021-12-13

## 2021-12-13 RX ORDER — VALSARTAN AND HYDROCHLOROTHIAZIDE 160; 12.5 MG/1; MG/1
1 TABLET, FILM COATED ORAL DAILY
COMMUNITY
End: 2021-12-13

## 2021-12-13 NOTE — PROGRESS NOTES
Chief Complaint  Follow-up and Hypertension (Pt states his BP machine at home is not accurate so hasn't been keeping track)    Subjective          Xavier Polanco presents to [facility]  --TOLERATING BLOOD PRESSURE MEDICATION WITHOUT APPARENT SIDE EFFECTS  --LAST LIPIDS WERE OK WITH DIETARY EFFORTS ONLY  --ON CHRONIC NSAIDS AND PRN TRAMADOL FOR HIS CHRONIC ARTHRITIS PAIN  --LAST TESTOSTERONE LEVEL WAS OK   --HIS ANXIETY IMPROVED WITH THE INCREASED DOSING OF THE BUSPAR         No Known Allergies     Health Maintenance Due   Topic Date Due   • COLORECTAL CANCER SCREENING  Never done   • TDAP/TD VACCINES (1 - Tdap) Never done   • ZOSTER VACCINE (1 of 2) Never done   • Pneumococcal Vaccine 65+ (2 of 2 - PPSV23) 02/02/2019   • COVID-19 Vaccine (2 - Moderna 3-dose booster series) 05/18/2021   • HEPATITIS C SCREENING  Never done   • ANNUAL WELLNESS VISIT  Never done   • INFLUENZA VACCINE  08/01/2021        Current Outpatient Medications on File Prior to Visit   Medication Sig   • amiodarone (PACERONE) 200 MG tablet Take 200 mg by mouth Daily.   • aspirin 81 MG EC tablet Take 81 mg by mouth Daily.   • busPIRone (BUSPAR) 15 MG tablet Take 1 tablet by mouth 2 (Two) Times a Day for 180 days.   • diclofenac (VOLTAREN) 75 MG EC tablet TAKE ONE TABLET BY MOUTH TWICE A DAY   • furosemide (LASIX) 20 MG tablet    • metoprolol succinate XL (TOPROL-XL) 25 MG 24 hr tablet Take 25 mg by mouth Daily.   • PARoxetine (Paxil) 20 MG tablet Take 1 tablet by mouth Every Morning.   • Testosterone 10 MG/ACT (2%) gel Place  on the skin as directed by provider.   • traMADol (ULTRAM) 50 MG tablet TAKE ONE TABLET BY MOUTH TWICE A DAY AS NEEDED   • valsartan (DIOVAN) 320 MG tablet Take 320 mg by mouth Daily.   • [DISCONTINUED] valsartan-hydrochlorothiazide (DIOVAN-HCT) 160-12.5 MG per tablet Take 1 tablet by mouth Daily.   • [DISCONTINUED] diclofenac (VOLTAREN) 75 MG EC tablet Take 1 tablet by mouth 2 (Two) Times a  "Day.     No current facility-administered medications on file prior to visit.       Immunization History   Administered Date(s) Administered   • COVID-19 (MODERNA) 1st, 2nd, 3rd Dose Only 03/25/2021, 04/20/2021   • Pneumococcal Conjugate 13-Valent (PCV13) 02/02/2018       Review of Systems   Constitutional: Negative for activity change, appetite change, chills, fatigue and fever.   HENT: Negative for congestion, ear pain, rhinorrhea and sore throat.    Eyes: Negative for blurred vision and discharge.   Respiratory: Negative for cough and shortness of breath.    Cardiovascular: Negative for chest pain, palpitations and leg swelling.   Gastrointestinal: Negative for abdominal pain, constipation, diarrhea, nausea and vomiting.   Musculoskeletal: Positive for arthralgias. Negative for myalgias.   Neurological: Negative for headache.   Psychiatric/Behavioral: Negative for depressed mood.        Objective     /59 (BP Location: Left arm, Patient Position: Sitting, Cuff Size: Large Adult)   Pulse 64   Ht 165.1 cm (65\")   Wt 103 kg (227 lb 3.2 oz)   SpO2 96%   BMI 37.81 kg/m²       Physical Exam  Vitals and nursing note reviewed.   Constitutional:       General: He is not in acute distress.     Appearance: Normal appearance.   Cardiovascular:      Rate and Rhythm: Normal rate and regular rhythm.      Heart sounds: Normal heart sounds. No murmur heard.      Pulmonary:      Effort: Pulmonary effort is normal.      Breath sounds: Normal breath sounds.   Abdominal:      Palpations: Abdomen is soft.      Tenderness: There is no abdominal tenderness.   Musculoskeletal:      Cervical back: Neck supple.      Right lower leg: No edema.      Left lower leg: No edema.   Lymphadenopathy:      Cervical: No cervical adenopathy.   Neurological:      General: No focal deficit present.      Mental Status: He is alert.      Cranial Nerves: No cranial nerve deficit.      Coordination: Coordination normal.      Gait: Gait normal. "   Psychiatric:         Mood and Affect: Mood normal.         Behavior: Behavior normal.         Result Review :                             Assessment and Plan      Diagnoses and all orders for this visit:    1. Testosterone deficiency (Primary)  Assessment & Plan:  IMPROVED WITH CURRENT TREATMENT, CONTINUE SAME, WILL REEVALUATE AT NEXT VISIT       2. Generalized osteoarthritis  Assessment & Plan:  IMPROVED WITH CURRENT TREATMENT, CONTINUE SAME, WILL REEVALUATE AT NEXT VISIT       3. Essential hypertension  Assessment & Plan:  Hypertension is improving with treatment.  Continue current treatment regimen.  Dietary sodium restriction.  Weight loss.  Regular aerobic exercise.  Continue current medications.  Blood pressure will be reassessed at the next regular appointment.      4. Elevated cholesterol  Assessment & Plan:  Lipid abnormalities are improving with lifestyle modifications.  Nutritional counseling was provided.  Lipids will be reassessed in 6 months.      5. Anxiety  Assessment & Plan:  IMPROVED WITH CURRENT TREATMENT, CONTINUE SAME, WILL REEVALUATE AT NEXT VISIT             Follow Up     Return in about 6 months (around 6/13/2022).    Patient was given instructions and counseling regarding his condition or for health maintenance advice. Please see specific information pulled into the AVS if appropriate.

## 2022-03-08 RX ORDER — PAROXETINE HYDROCHLORIDE 20 MG/1
TABLET, FILM COATED ORAL
Qty: 90 TABLET | Refills: 1 | Status: SHIPPED | OUTPATIENT
Start: 2022-03-08 | End: 2022-09-14 | Stop reason: SDUPTHER

## 2022-03-09 DIAGNOSIS — M15.9 GENERALIZED OSTEOARTHRITIS: ICD-10-CM

## 2022-03-10 NOTE — TELEPHONE ENCOUNTER
Rx Refill Note  Requested Prescriptions     Pending Prescriptions Disp Refills   • traMADol (ULTRAM) 50 MG tablet [Pharmacy Med Name: traMADol HCL 50MG TABLET] 60 tablet      Sig: TAKE ONE TABLET BY MOUTH TWICE A DAY AS NEEDED      Last office visit with prescribing clinician: 12/13/2021      Next office visit with prescribing clinician: 4/29/2022   Kristie Bullock LPN  03/10/22, 10:38 EST     -10/6/21 #60, RF-2

## 2022-03-11 RX ORDER — TRAMADOL HYDROCHLORIDE 50 MG/1
TABLET ORAL
Qty: 60 TABLET | Refills: 0 | Status: SHIPPED | OUTPATIENT
Start: 2022-03-11 | End: 2022-05-09 | Stop reason: SDUPTHER

## 2022-04-21 DIAGNOSIS — M15.9 GENERALIZED OSTEOARTHRITIS: ICD-10-CM

## 2022-04-21 RX ORDER — DICLOFENAC SODIUM 75 MG/1
75 TABLET, DELAYED RELEASE ORAL 2 TIMES DAILY
Qty: 180 TABLET | Refills: 0 | Status: SHIPPED | OUTPATIENT
Start: 2022-04-21 | End: 2022-09-14 | Stop reason: SDUPTHER

## 2022-04-21 NOTE — TELEPHONE ENCOUNTER
Caller: Xavier Polanco    Relationship: Self    Best call back number: [phone]    Requested Prescriptions:   Requested Prescriptions     Pending Prescriptions Disp Refills   • diclofenac (VOLTAREN) 75 MG EC tablet 180 tablet 0     Sig: Take 1 tablet by mouth 2 (Two) Times a Day.        Pharmacy where request should be sent: [facility] [address], [place] - [address]  - [phone]  - [phone] FX     Additional details provided by patient: THE PATIENT STATED HE IS COMPLETELY OUT OF MEDICATION    Does the patient have less than a 3 day supply:  [x] Yes  [] No    Ale Pal Rep   04/21/22 15:05 EDT

## 2022-05-05 ENCOUNTER — OFFICE VISIT (OUTPATIENT)
Dept: FAMILY MEDICINE CLINIC | Age: 72
End: 2022-05-05

## 2022-05-05 VITALS
SYSTOLIC BLOOD PRESSURE: 123 MMHG | BODY MASS INDEX: 35.62 KG/M2 | WEIGHT: 213.8 LBS | DIASTOLIC BLOOD PRESSURE: 42 MMHG | HEIGHT: 65 IN | HEART RATE: 56 BPM

## 2022-05-05 DIAGNOSIS — Z79.899 HIGH RISK MEDICATION USE: ICD-10-CM

## 2022-05-05 DIAGNOSIS — E34.9 TESTOSTERONE DEFICIENCY: ICD-10-CM

## 2022-05-05 DIAGNOSIS — M15.9 GENERALIZED OSTEOARTHRITIS: ICD-10-CM

## 2022-05-05 DIAGNOSIS — Z00.00 MEDICARE ANNUAL WELLNESS VISIT, SUBSEQUENT: Primary | ICD-10-CM

## 2022-05-05 DIAGNOSIS — E78.00 ELEVATED CHOLESTEROL: ICD-10-CM

## 2022-05-05 DIAGNOSIS — Z12.5 SCREENING FOR PROSTATE CANCER: ICD-10-CM

## 2022-05-05 DIAGNOSIS — I10 ESSENTIAL HYPERTENSION: ICD-10-CM

## 2022-05-05 LAB
AMPHET+METHAMPHET UR QL: NEGATIVE
AMPHETAMINES UR QL: NEGATIVE
BARBITURATES UR QL SCN: NEGATIVE
BENZODIAZ UR QL SCN: NEGATIVE
BUPRENORPHINE SERPL-MCNC: NEGATIVE NG/ML
CANNABINOIDS SERPL QL: NEGATIVE
COCAINE UR QL: NEGATIVE
EXPIRATION DATE: NORMAL
Lab: NORMAL
MDMA UR QL SCN: NEGATIVE
METHADONE UR QL SCN: NEGATIVE
OPIATES UR QL: NEGATIVE
OXYCODONE UR QL SCN: NEGATIVE
PCP UR QL SCN: NEGATIVE

## 2022-05-05 PROCEDURE — 1159F MED LIST DOCD IN RCRD: CPT | Performed by: FAMILY MEDICINE

## 2022-05-05 PROCEDURE — 99214 OFFICE O/P EST MOD 30 MIN: CPT | Performed by: FAMILY MEDICINE

## 2022-05-05 PROCEDURE — 80305 DRUG TEST PRSMV DIR OPT OBS: CPT | Performed by: FAMILY MEDICINE

## 2022-05-05 PROCEDURE — 1170F FXNL STATUS ASSESSED: CPT | Performed by: FAMILY MEDICINE

## 2022-05-05 PROCEDURE — G0439 PPPS, SUBSEQ VISIT: HCPCS | Performed by: FAMILY MEDICINE

## 2022-05-05 RX ORDER — AMIODARONE HYDROCHLORIDE 200 MG/1
200 TABLET ORAL DAILY
COMMUNITY
Start: 2022-04-20

## 2022-05-05 NOTE — ASSESSMENT & PLAN NOTE
STABLE ON CURRENT MEDICATION.  CHASITY REVIEWED.  TOX SCREEN IS UP TO DATE.  THE CONTINUED USE OF A CONTROLLED SUBSTANCE IS NECESSARY FOR THIS PATIENT TO HAVE A NEAR NORMAL QUALITY OF LIFE AND WILL BE REVIEWED AT THE NEXT ROUTINE VISIT.

## 2022-05-05 NOTE — ASSESSMENT & PLAN NOTE
ADVICE GIVEN RE:  SEATBELT USE, ALCOHOL USE, HEALTHY DIET, ROUTINE EYE AND DENTAL EXAM, ROUTINE VACCINATIONS.

## 2022-05-05 NOTE — PROGRESS NOTES
The ABCs of the Annual Wellness Visit  Subsequent Medicare Wellness Visit    Chief Complaint   Patient presents with   • Medicare Wellness-subsequent      Subjective    History of Present Illness:  Xavier Polanco is a 71 y.o. male who presents for a Subsequent Medicare Wellness Visit.  IN ADDITION:  --TOLERATING BP MEDS WITHOUT APPAENT SIDE EFFECTS  --LAST LIPIDS WERE BORDERLINE ON DIETARY EFFORTS ONLY  --CONTINUES ON ROUTINE TRAMADOL FOR CHORIC OA PAIN, STABLE  --TESTOSTERONE WAS LOW BUT DID NOT START THE GEL SUPPLEMENT   --DUE FOR ROUTINE LABS AND TOX     The following portions of the patient's history were reviewed and   updated as appropriate: allergies, current medications, past family history, past medical history, past social history, past surgical history and problem list.    Compared to one year ago, the patient feels his physical   health is the same.    Compared to one year ago, the patient feels his mental   health is the same.    Recent Hospitalizations:  He was not admitted to the hospital during the last year.       Current Medical Providers:  Patient Care Team:  Esvin Gilliam MD as PCP - General (Family Medicine)    Outpatient Medications Prior to Visit   Medication Sig Dispense Refill   • amiodarone (PACERONE) 200 MG tablet      • aspirin 81 MG EC tablet Take 81 mg by mouth Daily.     • diclofenac (VOLTAREN) 75 MG EC tablet Take 1 tablet by mouth 2 (Two) Times a Day. 180 tablet 0   • furosemide (LASIX) 20 MG tablet      • metoprolol succinate XL (TOPROL-XL) 25 MG 24 hr tablet Take 25 mg by mouth Daily.     • PARoxetine (PAXIL) 20 MG tablet TAKE ONE TABLET BY MOUTH EVERY MORNING 90 tablet 1   • Testosterone 10 MG/ACT (2%) gel Place  on the skin as directed by provider.     • traMADol (ULTRAM) 50 MG tablet TAKE ONE TABLET BY MOUTH TWICE A DAY AS NEEDED 60 tablet 0   • valsartan (DIOVAN) 320 MG tablet Take 320 mg by mouth Daily.     • busPIRone (BUSPAR) 15 MG tablet Take 1 tablet by mouth 2  (Two) Times a Day for 180 days. 180 tablet 1     No facility-administered medications prior to visit.       Opioid medication/s are on active medication list.  and I have evaluated his active treatment plan and pain score trends (see table).  There were no vitals filed for this visit.  I have reviewed the chart for potential of high risk medication and harmful drug interactions in the elderly.            Aspirin is on active medication list. Aspirin use is indicated based on review of current medical condition/s. Pros and cons of this therapy have been discussed today. Benefits of this medication outweigh potential harm.  Patient has been encouraged to continue taking this medication.  .      Patient Active Problem List   Diagnosis   • Essential hypertension   • Elevated cholesterol   • Anxiety   • Paroxysmal atrial fibrillation (HCC)   • Obstructive sleep apnea (cannot tolerate CPAP)    • Generalized osteoarthritis   • Testosterone deficiency   • Medicare annual wellness visit, subsequent     Advance Care Planning  Advance Directive is not on file.  ACP discussion was held with the patient during this visit. Patient has an advance directive (not in EMR), copy requested.    Review of Systems   Constitutional: Negative for activity change, appetite change, chills, fatigue and fever.   HENT: Negative for congestion, ear pain, hearing loss, rhinorrhea and sore throat.    Eyes: Negative for discharge and visual disturbance.   Respiratory: Negative for cough and shortness of breath.    Cardiovascular: Negative for chest pain, palpitations and leg swelling.   Gastrointestinal: Negative for abdominal pain, nausea and vomiting.   Genitourinary: Negative for dysuria and hematuria.   Musculoskeletal: Positive for arthralgias. Negative for myalgias.   Psychiatric/Behavioral: Negative for dysphoric mood.        Objective    Vitals:    05/05/22 1343   BP: 123/42   BP Location: Left arm   Patient Position: Sitting   Pulse: 56  "  Weight: 97 kg (213 lb 12.8 oz)   Height: 165.1 cm (65\")     BMI Readings from Last 1 Encounters:   22 35.58 kg/m²   BMI is above normal parameters. Recommendations include: nutrition counseling    Does the patient have evidence of cognitive impairment? No    Physical Exam  Vitals and nursing note reviewed.   Constitutional:       General: He is not in acute distress.     Appearance: Normal appearance.   HENT:      Right Ear: Tympanic membrane normal.      Left Ear: Tympanic membrane normal.      Mouth/Throat:      Pharynx: Oropharynx is clear.   Eyes:      Conjunctiva/sclera: Conjunctivae normal.   Cardiovascular:      Rate and Rhythm: Normal rate and regular rhythm.      Heart sounds: Normal heart sounds. No murmur heard.  Pulmonary:      Effort: Pulmonary effort is normal.      Breath sounds: Normal breath sounds.   Abdominal:      General: Bowel sounds are normal.      Palpations: Abdomen is soft.      Tenderness: There is no abdominal tenderness.   Musculoskeletal:      Cervical back: Neck supple.      Right lower leg: No edema.      Left lower leg: No edema.   Lymphadenopathy:      Cervical: No cervical adenopathy.   Neurological:      General: No focal deficit present.      Mental Status: He is alert.      Cranial Nerves: No cranial nerve deficit.      Coordination: Coordination normal.      Gait: Gait normal.   Psychiatric:         Mood and Affect: Mood normal.         Behavior: Behavior normal.                 HEALTH RISK ASSESSMENT    Smoking Status:  Social History     Tobacco Use   Smoking Status Former Smoker   • Types: Cigarettes, Cigars   • Quit date:    • Years since quittin.3   Smokeless Tobacco Never Used     Alcohol Consumption:  Social History     Substance and Sexual Activity   Alcohol Use Not Currently    Comment: ocassionally     Fall Risk Screen:    [facility].    Depression Screening:  PHQ-2/PHQ-9 Depression Screening 2022   Retired " PHQ-9 Total Score -   Retired Total Score -   Little Interest or Pleasure in Doing Things 0-->not at all   Feeling Down, Depressed or Hopeless 0-->not at all   PHQ-9: Brief Depression Severity Measure Score 0       Health Habits and Functional and Cognitive Screening:  Functional & Cognitive Status 5/5/2022   Do you have difficulty preparing food and eating? No   Do you have difficulty bathing yourself, getting dressed or grooming yourself? No   Do you have difficulty using the toilet? No   Do you have difficulty moving around from place to place? No   Do you have trouble with steps or getting out of a bed or a chair? No   Current Diet Other        Current Diet Comment not a big appetite   Dental Exam Up to date   Eye Exam Up to date   Exercise (times per week) 7 times per week   Current Exercises Include Walking   Do you need help using the phone?  No   Are you deaf or do you have serious difficulty hearing?  No   Do you need help with transportation? No   Do you need help shopping? No   Do you need help preparing meals?  No   Do you need help with housework?  No   Do you need help with laundry? No   Do you need help taking your medications? No   Do you need help managing money? No   Do you ever drive or ride in a car without wearing a seat belt? No   Have you felt unusual stress, anger or loneliness in the last month? No   Who do you live with? Alone   If you need help, do you have trouble finding someone available to you? No   Do you have difficulty concentrating, remembering or making decisions? No       Age-appropriate Screening Schedule:  Refer to the list below for future screening recommendations based on patient's age, sex and/or medical conditions. Orders for these recommended tests are listed in the plan section. The patient has been provided with a written plan.    Health Maintenance   Topic Date Due   • TDAP/TD VACCINES (1 - Tdap) Never done   • ZOSTER VACCINE (1 of 2) Never done   • INFLUENZA VACCINE   08/01/2022   • LIPID PANEL  09/08/2022              Assessment/Plan   CMS Preventative Services Quick Reference  Risk Factors Identified During Encounter  Chronic Pain   Dementia/Memory   Depression/Dysphoria  Fall Risk-High or Moderate  Glaucoma or Family History of Glaucoma  Hearing Problem  Immunizations Discussed/Encouraged (specific Immunizations; COVID19  The above risks/problems have been discussed with the patient.  Follow up actions/plans if indicated are seen below in the Assessment/Plan Section.  Pertinent information has been shared with the patient in the After Visit Summary.    Diagnoses and all orders for this visit:    1. Medicare annual wellness visit, subsequent (Primary)  Assessment & Plan:  ADVICE GIVEN RE:  SEATBELT USE, ALCOHOL USE, HEALTHY DIET, ROUTINE EYE AND DENTAL EXAM, ROUTINE VACCINATIONS.        2. High risk medication use  -     POC Urine Drug Screen [facility]    3. Elevated cholesterol  Assessment & Plan:  Lipid abnormalities are improving with lifestyle modifications.  Nutritional counseling was provided.  Lipids will be reassessed in 6 months.    Orders:  -     Lipid Panel    4. Essential hypertension  Assessment & Plan:  Hypertension is improving with treatment.  Continue current treatment regimen.  Regular aerobic exercise.  Continue current medications.  Blood pressure will be reassessed at the next regular appointment.    Orders:  -     CBC (No Diff)  -     Comprehensive Metabolic Panel  -     TSH    5. Generalized osteoarthritis  Assessment & Plan:  STABLE ON CURRENT MEDICATION.  CHASITY REVIEWED.  TOX SCREEN IS UP TO DATE.  THE CONTINUED USE OF A CONTROLLED SUBSTANCE IS NECESSARY FOR THIS PATIENT TO HAVE A NEAR NORMAL QUALITY OF LIFE AND WILL BE REVIEWED AT THE NEXT ROUTINE VISIT.      6. Testosterone deficiency  Assessment & Plan:  WILL RECHECK LAB AND CONSIDER STARTING SUPPLEMENT IF STILL LOW     Orders:  -     Testosterone    7. Screening for prostate cancer  -     PSA  Screen      Follow Up:   Return in about 6 months (around 11/5/2022).     An After Visit Summary and PPPS were made available to the patient.

## 2022-05-09 DIAGNOSIS — M15.9 GENERALIZED OSTEOARTHRITIS: ICD-10-CM

## 2022-05-09 NOTE — TELEPHONE ENCOUNTER
Caller: Xavier Polanco    Relationship: Self    Best call back number: [phone]    Requested Prescriptions:   Requested Prescriptions     Pending Prescriptions Disp Refills   • traMADol (ULTRAM) 50 MG tablet 60 tablet 0     Sig: Take 1 tablet by mouth 2 (Two) Times a Day As Needed.        Pharmacy where request should be sent: [facility], [place] - [address]  - [phone]  - [phone] FX     Additional details provided by patient: PATIENT IS FULLY OUT OF MEDICATION.    Does the patient have less than a 3 day supply:  [x] Yes  [] No    Ale Figueroa Rep   05/09/22 12:44 EDT

## 2022-05-09 NOTE — TELEPHONE ENCOUNTER
Rx Refill Note  Requested Prescriptions     Pending Prescriptions Disp Refills   • traMADol (ULTRAM) 50 MG tablet 60 tablet 0     Sig: Take 1 tablet by mouth 2 (Two) Times a Day As Needed.      Last office visit with prescribing clinician: 5/5/2022      Next office visit with prescribing clinician: 11/9/2022  Last fill sent: 03/11/22, #60  Tox: 05/05/22    Lily Vale LPN  05/09/22, 12:59 EDT

## 2022-05-10 ENCOUNTER — TELEPHONE (OUTPATIENT)
Dept: FAMILY MEDICINE CLINIC | Age: 72
End: 2022-05-10

## 2022-05-10 RX ORDER — TRAMADOL HYDROCHLORIDE 50 MG/1
50 TABLET ORAL 2 TIMES DAILY PRN
Qty: 60 TABLET | Refills: 1 | Status: SHIPPED | OUTPATIENT
Start: 2022-05-10 | End: 2022-08-02 | Stop reason: SDUPTHER

## 2022-05-12 ENCOUNTER — LAB (OUTPATIENT)
Dept: LAB | Facility: HOSPITAL | Age: 72
End: 2022-05-12

## 2022-05-12 LAB
ALBUMIN SERPL-MCNC: 4.2 G/DL (ref 3.5–5.2)
ALBUMIN/GLOB SERPL: 1.5 G/DL
ALP SERPL-CCNC: 107 U/L (ref 39–117)
ALT SERPL W P-5'-P-CCNC: 26 U/L (ref 1–41)
ANION GAP SERPL CALCULATED.3IONS-SCNC: 12.2 MMOL/L (ref 5–15)
AST SERPL-CCNC: 24 U/L (ref 1–40)
BILIRUB SERPL-MCNC: 1.2 MG/DL (ref 0–1.2)
BUN SERPL-MCNC: 20 MG/DL (ref 8–23)
BUN/CREAT SERPL: 12 (ref 7–25)
CALCIUM SPEC-SCNC: 9.9 MG/DL (ref 8.6–10.5)
CHLORIDE SERPL-SCNC: 97 MMOL/L (ref 98–107)
CHOLEST SERPL-MCNC: 160 MG/DL (ref 0–200)
CO2 SERPL-SCNC: 28.8 MMOL/L (ref 22–29)
CREAT SERPL-MCNC: 1.66 MG/DL (ref 0.76–1.27)
DEPRECATED RDW RBC AUTO: 57.4 FL (ref 37–54)
EGFRCR SERPLBLD CKD-EPI 2021: 44.1 ML/MIN/1.73
ERYTHROCYTE [DISTWIDTH] IN BLOOD BY AUTOMATED COUNT: 14.5 % (ref 12.3–15.4)
GLOBULIN UR ELPH-MCNC: 2.8 GM/DL
GLUCOSE SERPL-MCNC: 122 MG/DL (ref 65–99)
HCT VFR BLD AUTO: 49.8 % (ref 37.5–51)
HDLC SERPL-MCNC: 73 MG/DL (ref 40–60)
HGB BLD-MCNC: 15.9 G/DL (ref 13–17.7)
LDLC SERPL CALC-MCNC: 73 MG/DL (ref 0–100)
LDLC/HDLC SERPL: 1 {RATIO}
MCH RBC QN AUTO: 33.7 PG (ref 26.6–33)
MCHC RBC AUTO-ENTMCNC: 31.9 G/DL (ref 31.5–35.7)
MCV RBC AUTO: 105.5 FL (ref 79–97)
PLATELET # BLD AUTO: 223 10*3/MM3 (ref 140–450)
PMV BLD AUTO: 9 FL (ref 6–12)
POTASSIUM SERPL-SCNC: 5.4 MMOL/L (ref 3.5–5.2)
PROT SERPL-MCNC: 7 G/DL (ref 6–8.5)
PSA SERPL-MCNC: 0.73 NG/ML (ref 0–4)
RBC # BLD AUTO: 4.72 10*6/MM3 (ref 4.14–5.8)
SODIUM SERPL-SCNC: 138 MMOL/L (ref 136–145)
TESTOST SERPL-MCNC: 446 NG/DL (ref 193–740)
TRIGL SERPL-MCNC: 71 MG/DL (ref 0–150)
TSH SERPL DL<=0.05 MIU/L-ACNC: 3.01 UIU/ML (ref 0.27–4.2)
VLDLC SERPL-MCNC: 14 MG/DL (ref 5–40)
WBC NRBC COR # BLD: 7.62 10*3/MM3 (ref 3.4–10.8)

## 2022-05-12 PROCEDURE — 80053 COMPREHEN METABOLIC PANEL: CPT | Performed by: FAMILY MEDICINE

## 2022-05-12 PROCEDURE — 84403 ASSAY OF TOTAL TESTOSTERONE: CPT | Performed by: FAMILY MEDICINE

## 2022-05-12 PROCEDURE — 80061 LIPID PANEL: CPT | Performed by: FAMILY MEDICINE

## 2022-05-12 PROCEDURE — 36415 COLL VENOUS BLD VENIPUNCTURE: CPT | Performed by: FAMILY MEDICINE

## 2022-05-12 PROCEDURE — 84443 ASSAY THYROID STIM HORMONE: CPT | Performed by: FAMILY MEDICINE

## 2022-05-12 PROCEDURE — 85027 COMPLETE CBC AUTOMATED: CPT | Performed by: FAMILY MEDICINE

## 2022-05-12 PROCEDURE — G0103 PSA SCREENING: HCPCS | Performed by: FAMILY MEDICINE

## 2022-05-18 DIAGNOSIS — N28.9 ABNORMAL KIDNEY FUNCTION: ICD-10-CM

## 2022-05-18 DIAGNOSIS — R73.9 ELEVATED BLOOD SUGAR: Primary | ICD-10-CM

## 2022-07-11 NOTE — PROGRESS NOTES
"Chief Complaint  Earache (Pt c/o (L) ear ache./Pt states that he thinks he may have an ear infection, ongoing for about a week. Pt states he feels dizzy at times.//), Cough, Alcohol Detox (Pt is detoxing alcohol, pt is dehydrated.), and Diarrhea    Subjective          Xavier Polanco presents to [facility]  He stopped drinking etoh 2 weeks ago.     Earache   There is pain in the left ear. This is a new problem. The current episode started in the past 7 days. The problem has been gradually improving. There has been no fever. Associated symptoms include coughing (productive- sputum), ear discharge (clear), hearing loss and rhinorrhea. Pertinent negatives include no diarrhea, headaches, sore throat or vomiting. Associated symptoms comments: Admits: dizziness. He has tried nothing for the symptoms. There is no history of a tympanostomy tube.       Objective   Vital Signs:   /75 (BP Location: Left arm, Patient Position: Sitting)   Pulse 72   Temp 98.4 °F (36.9 °C) (Oral)   Ht 165.1 cm (65\")   Wt 89.4 kg (197 lb)   SpO2 95% Comment: room air  BMI 32.78 kg/m²     Physical Exam  Constitutional:       General: He is not in acute distress.     Appearance: Normal appearance. He is obese.   HENT:      Head: Normocephalic.      Right Ear: Ear canal and external ear normal. Tympanic membrane is scarred.      Left Ear: Tympanic membrane is scarred, erythematous and bulging.   Eyes:      Pupils: Pupils are equal, round, and reactive to light.      Visual Fields: Right eye visual fields normal and left eye visual fields normal.   Neck:      Trachea: Trachea normal.   Cardiovascular:      Rate and Rhythm: Normal rate and regular rhythm.      Heart sounds: Normal heart sounds.   Pulmonary:      Effort: Pulmonary effort is normal. No respiratory distress.      Breath sounds: Normal air entry. No stridor. Wheezing and rhonchi present.   Musculoskeletal:      Right lower leg: No edema.      " Left lower leg: No edema.   Skin:     General: Skin is warm and dry.   Neurological:      Mental Status: He is alert and oriented to person, place, and time.   Psychiatric:         Mood and Affect: Mood and affect normal.         Behavior: Behavior normal.         Thought Content: Thought content normal.        Result Review :   The following data was reviewed by: KIARA Ruiz on 07/12/2022:                  Assessment and Plan    Diagnoses and all orders for this visit:    1. Non-recurrent acute suppurative otitis media of left ear without spontaneous rupture of tympanic membrane (Primary)  -     amoxicillin-clavulanate (Augmentin) 875-125 MG per tablet; Take 1 tablet by mouth 2 (Two) Times a Day for 10 days.  Dispense: 20 tablet; Refill: 0    2. Cough  -     XR Chest PA & Lateral; Future    He stopped drinking 2 weeks ago.  His left ear does look bad, so we will go ahead and send in Augmentin.  We will also obtain a chest x-ray to rule out pneumonia.      Follow Up   Return if symptoms worsen or fail to improve.  Patient was given instructions and counseling regarding his condition or for health maintenance advice. Please see specific information pulled into the AVS if appropriate.

## 2022-07-12 ENCOUNTER — OFFICE VISIT (OUTPATIENT)
Dept: FAMILY MEDICINE CLINIC | Age: 72
End: 2022-07-12

## 2022-07-12 ENCOUNTER — HOSPITAL ENCOUNTER (OUTPATIENT)
Dept: GENERAL RADIOLOGY | Facility: HOSPITAL | Age: 72
Discharge: HOME | End: 2022-07-12
Admitting: NURSE PRACTITIONER

## 2022-07-12 VITALS
HEART RATE: 72 BPM | HEIGHT: 65 IN | TEMPERATURE: 98.4 F | DIASTOLIC BLOOD PRESSURE: 75 MMHG | OXYGEN SATURATION: 95 % | SYSTOLIC BLOOD PRESSURE: 134 MMHG | WEIGHT: 197 LBS | BODY MASS INDEX: 32.82 KG/M2

## 2022-07-12 DIAGNOSIS — R05.9 COUGH: ICD-10-CM

## 2022-07-12 DIAGNOSIS — R93.89 ABNORMAL CXR: Primary | ICD-10-CM

## 2022-07-12 DIAGNOSIS — H66.002 NON-RECURRENT ACUTE SUPPURATIVE OTITIS MEDIA OF LEFT EAR WITHOUT SPONTANEOUS RUPTURE OF TYMPANIC MEMBRANE: Primary | ICD-10-CM

## 2022-07-12 PROCEDURE — 99213 OFFICE O/P EST LOW 20 MIN: CPT | Performed by: NURSE PRACTITIONER

## 2022-07-12 PROCEDURE — 71046 X-RAY EXAM CHEST 2 VIEWS: CPT

## 2022-07-12 RX ORDER — AMOXICILLIN AND CLAVULANATE POTASSIUM 875; 125 MG/1; MG/1
1 TABLET, FILM COATED ORAL 2 TIMES DAILY
Qty: 20 TABLET | Refills: 0 | Status: SHIPPED | OUTPATIENT
Start: 2022-07-12 | End: 2022-07-22

## 2022-07-12 RX ORDER — DOXYCYCLINE HYCLATE 100 MG/1
100 CAPSULE ORAL 2 TIMES DAILY
Qty: 20 CAPSULE | Refills: 0 | Status: SHIPPED | OUTPATIENT
Start: 2022-07-12 | End: 2022-07-22

## 2022-07-19 RX ORDER — BUSPIRONE HYDROCHLORIDE 15 MG/1
15 TABLET ORAL 2 TIMES DAILY
Qty: 180 TABLET | Refills: 1 | Status: SHIPPED | OUTPATIENT
Start: 2022-07-19 | End: 2023-03-13

## 2022-07-19 NOTE — TELEPHONE ENCOUNTER
Caller: Xavier Polanco    Relationship: Self    Best call back number: [phone]    Requested Prescriptions:   Requested Prescriptions     Pending Prescriptions Disp Refills   • busPIRone (BUSPAR) 15 MG tablet 180 tablet 1     Sig: Take 1 tablet by mouth 2 (Two) Times a Day for 180 days.        Pharmacy where request should be sent: [facility] [address] [place] [address], [place] - [address]  - [phone]  - [phone] FX     Additional details provided by patient: PATIENT TOOK LAST PILL THIS MORNING    Does the patient have less than a 3 day supply:  [x] Yes  [] No    Ale Kruse Rep   07/19/22 10:29 EDT

## 2022-07-29 ENCOUNTER — TELEPHONE (OUTPATIENT)
Dept: FAMILY MEDICINE CLINIC | Age: 72
End: 2022-07-29

## 2022-08-02 DIAGNOSIS — M15.9 GENERALIZED OSTEOARTHRITIS: ICD-10-CM

## 2022-08-02 RX ORDER — TRAMADOL HYDROCHLORIDE 50 MG/1
50 TABLET ORAL 2 TIMES DAILY PRN
Qty: 60 TABLET | Refills: 2 | Status: SHIPPED | OUTPATIENT
Start: 2022-08-02 | End: 2022-11-10 | Stop reason: SDUPTHER

## 2022-08-02 NOTE — TELEPHONE ENCOUNTER
Caller: Xavier Polanco    Relationship: Self    Best call back number: [phone]    Requested Prescriptions:   Requested Prescriptions     Pending Prescriptions Disp Refills   • traMADol (ULTRAM) 50 MG tablet 60 tablet 1     Sig: Take 1 tablet by mouth 2 (Two) Times a Day As Needed for Moderate Pain .        Pharmacy where request should be sent: [facility] [address] [place] [address], [place] - [address]  - [phone]  - [phone] FX     Additional details provided by patient:     Does the patient have less than a 3 day supply:  [x] Yes  [] No    Ale Pugh Rep   08/02/22 16:24 EDT

## 2022-08-02 NOTE — TELEPHONE ENCOUNTER
Rx Refill Note  Requested Prescriptions     Pending Prescriptions Disp Refills   • traMADol (ULTRAM) 50 MG tablet 60 tablet 1     Sig: Take 1 tablet by mouth 2 (Two) Times a Day As Needed for Moderate Pain .      Last office visit with prescribing clinician: 5/5/2022      Next office visit with prescribing clinician: 11/9/2022 last filled 5/10/22 with 60 tabs and 1 refill  Last UDS:POC Urine Drug Screen [facility] (05/05/2022 14:55)     Elma Sousa MA  08/02/22, 16:42 EDT

## 2022-08-05 ENCOUNTER — LAB (OUTPATIENT)
Dept: LAB | Facility: HOSPITAL | Age: 72
End: 2022-08-05

## 2022-08-05 DIAGNOSIS — R73.9 ELEVATED BLOOD SUGAR: ICD-10-CM

## 2022-08-05 DIAGNOSIS — N28.9 ABNORMAL KIDNEY FUNCTION: ICD-10-CM

## 2022-08-05 LAB
ANION GAP SERPL CALCULATED.3IONS-SCNC: 9.6 MMOL/L (ref 5–15)
BUN SERPL-MCNC: 12 MG/DL (ref 8–23)
BUN/CREAT SERPL: 9.5 (ref 7–25)
CALCIUM SPEC-SCNC: 9.2 MG/DL (ref 8.6–10.5)
CHLORIDE SERPL-SCNC: 99 MMOL/L (ref 98–107)
CO2 SERPL-SCNC: 29.4 MMOL/L (ref 22–29)
CREAT SERPL-MCNC: 1.26 MG/DL (ref 0.76–1.27)
EGFRCR SERPLBLD CKD-EPI 2021: 61.4 ML/MIN/1.73
GLUCOSE SERPL-MCNC: 123 MG/DL (ref 65–99)
HBA1C MFR BLD: 4.7 % (ref 4.8–5.6)
POTASSIUM SERPL-SCNC: 4.6 MMOL/L (ref 3.5–5.2)
SODIUM SERPL-SCNC: 138 MMOL/L (ref 136–145)

## 2022-08-05 PROCEDURE — 80048 BASIC METABOLIC PNL TOTAL CA: CPT

## 2022-08-05 PROCEDURE — 83036 HEMOGLOBIN GLYCOSYLATED A1C: CPT

## 2022-08-05 PROCEDURE — 36415 COLL VENOUS BLD VENIPUNCTURE: CPT

## 2022-09-14 DIAGNOSIS — M15.9 GENERALIZED OSTEOARTHRITIS: ICD-10-CM

## 2022-09-14 RX ORDER — PAROXETINE HYDROCHLORIDE 20 MG/1
20 TABLET, FILM COATED ORAL EVERY MORNING
Qty: 90 TABLET | Refills: 1 | Status: SHIPPED | OUTPATIENT
Start: 2022-09-14 | End: 2023-03-13

## 2022-09-14 RX ORDER — DICLOFENAC SODIUM 75 MG/1
75 TABLET, DELAYED RELEASE ORAL 2 TIMES DAILY
Qty: 180 TABLET | Refills: 0 | Status: SHIPPED | OUTPATIENT
Start: 2022-09-14 | End: 2023-04-04 | Stop reason: SDUPTHER

## 2022-09-14 NOTE — TELEPHONE ENCOUNTER
Caller: Xavier Polanco    Relationship: Self    Best call back number: [phone]    Requested Prescriptions:   Requested Prescriptions     Pending Prescriptions Disp Refills   • diclofenac (VOLTAREN) 75 MG EC tablet 180 tablet 0     Sig: Take 1 tablet by mouth 2 (Two) Times a Day.   • PARoxetine (PAXIL) 20 MG tablet 90 tablet 1     Sig: Take 1 tablet by mouth Every Morning.        Pharmacy where request should be sent: [facility] [address] [place] [address], [place] [address] [facility] [phone]  - [phone] FX     Additional details provided by patient: PATIENT IS OUT OF MEDICATION  Does the patient have less than a 3 day supply:  [x] Yes  [] No    Ale Soria Rep   09/14/22 14:17 EDT

## 2022-11-09 ENCOUNTER — OFFICE VISIT (OUTPATIENT)
Dept: FAMILY MEDICINE CLINIC | Age: 72
End: 2022-11-09

## 2022-11-09 VITALS
BODY MASS INDEX: 33.59 KG/M2 | HEIGHT: 65 IN | HEART RATE: 59 BPM | SYSTOLIC BLOOD PRESSURE: 150 MMHG | WEIGHT: 201.6 LBS | DIASTOLIC BLOOD PRESSURE: 80 MMHG

## 2022-11-09 DIAGNOSIS — M15.9 GENERALIZED OSTEOARTHRITIS: ICD-10-CM

## 2022-11-09 DIAGNOSIS — E78.00 ELEVATED CHOLESTEROL: ICD-10-CM

## 2022-11-09 DIAGNOSIS — I10 ESSENTIAL HYPERTENSION: ICD-10-CM

## 2022-11-09 DIAGNOSIS — Z23 NEED FOR INFLUENZA VACCINATION: Primary | ICD-10-CM

## 2022-11-09 DIAGNOSIS — F41.9 ANXIETY: ICD-10-CM

## 2022-11-09 DIAGNOSIS — I48.0 PAROXYSMAL ATRIAL FIBRILLATION: ICD-10-CM

## 2022-11-09 PROBLEM — E34.9 TESTOSTERONE DEFICIENCY: Status: RESOLVED | Noted: 2021-12-13 | Resolved: 2022-11-09

## 2022-11-09 PROBLEM — Z00.00 MEDICARE ANNUAL WELLNESS VISIT, SUBSEQUENT: Status: RESOLVED | Noted: 2022-05-05 | Resolved: 2022-11-09

## 2022-11-09 PROCEDURE — 99214 OFFICE O/P EST MOD 30 MIN: CPT | Performed by: FAMILY MEDICINE

## 2022-11-09 PROCEDURE — 90662 IIV NO PRSV INCREASED AG IM: CPT | Performed by: FAMILY MEDICINE

## 2022-11-09 PROCEDURE — G0008 ADMIN INFLUENZA VIRUS VAC: HCPCS | Performed by: FAMILY MEDICINE

## 2022-11-09 NOTE — PROGRESS NOTES
Chief Complaint  Hypertension    Subjective          Xavier Polanco presents to [facility]  History of Present Illness  --TOLERATING BLOOD PRESSURE MEDICATION WITHOUT APPARENT SIDE EFFECTS  --HISTORY OF PAROXYSMAL ATRIAL FIB, NO NOTICEABLE PALPITATIONS AT THIS TIME  --CONTINUES ON AN SSRI AND BUSPAR FOR CHRONIC ANXIETY, DOING WELL  --CONTINUES ON ROUTINE TRAMADOL FOR ARTHRITIS PAIN, STABLE  --LAST LIPIDS WERE OK ON DIETARY EFFORTS ONLY        No Known Allergies     Health Maintenance Due   Topic Date Due   • COLORECTAL CANCER SCREENING  Never done   • TDAP/TD VACCINES (1 - Tdap) Never done   • ZOSTER VACCINE (1 of 2) Never done   • COVID-19 Vaccine (3 - Booster) 06/15/2021   • HEPATITIS C SCREENING  Never done   • AAA SCREEN (ONE-TIME)  Never done        Current Outpatient Medications on File Prior to Visit   Medication Sig   • amiodarone (PACERONE) 200 MG tablet    • aspirin 81 MG EC tablet Take 81 mg by mouth Daily.   • busPIRone (BUSPAR) 15 MG tablet Take 1 tablet by mouth 2 (Two) Times a Day for 180 days.   • diclofenac (VOLTAREN) 75 MG EC tablet Take 1 tablet by mouth 2 (Two) Times a Day.   • furosemide (LASIX) 20 MG tablet    • metoprolol succinate XL (TOPROL-XL) 25 MG 24 hr tablet Take 25 mg by mouth Daily.   • PARoxetine (PAXIL) 20 MG tablet Take 1 tablet by mouth Every Morning.   • traMADol (ULTRAM) 50 MG tablet Take 1 tablet by mouth 2 (Two) Times a Day As Needed for Moderate Pain .   • valsartan (DIOVAN) 320 MG tablet Take 320 mg by mouth Daily.   • [DISCONTINUED] Testosterone 10 MG/ACT (2%) gel Place  on the skin as directed by provider.     No current facility-administered medications on file prior to visit.       Immunization History   Administered Date(s) Administered   • COVID-19 (MODERNA) 1st, 2nd, 3rd Dose Only 03/25/2021, 03/25/2021, 04/20/2021, 04/20/2021   • Fluzone High Dose =>65 Years ([employer] ONLY) 10/31/2017   • Fluzone High-Dose 65+yrs 12/13/2021,  "11/09/2022   • Pneumococcal Conjugate 13-Valent (PCV13) 02/02/2018, 02/02/2018   • Pneumococcal Polysaccharide (PPSV23) 02/26/2019       Review of Systems   Constitutional: Negative for activity change, appetite change, chills, fatigue and fever.   HENT: Negative for congestion, ear pain, rhinorrhea and sore throat.    Respiratory: Negative for cough and shortness of breath.    Cardiovascular: Negative for chest pain, palpitations and leg swelling.   Gastrointestinal: Negative for abdominal pain, constipation, diarrhea, nausea and vomiting.   Musculoskeletal: Positive for arthralgias. Negative for myalgias.   Neurological: Negative for headache.        Objective     /80 (BP Location: Left arm, Patient Position: Sitting)   Pulse 59   Ht 165.1 cm (65\")   Wt 91.4 kg (201 lb 9.6 oz)   BMI 33.55 kg/m²       Physical Exam  Vitals and nursing note reviewed.   Constitutional:       General: He is not in acute distress.     Appearance: Normal appearance.   Cardiovascular:      Rate and Rhythm: Normal rate and regular rhythm.      Heart sounds: Normal heart sounds. No murmur heard.  Pulmonary:      Effort: Pulmonary effort is normal.      Breath sounds: Normal breath sounds.   Abdominal:      Palpations: Abdomen is soft.      Tenderness: There is no abdominal tenderness.   Musculoskeletal:      Cervical back: Neck supple.      Right lower leg: No edema.      Left lower leg: No edema.   Lymphadenopathy:      Cervical: No cervical adenopathy.   Neurological:      General: No focal deficit present.      Mental Status: He is alert.      Cranial Nerves: No cranial nerve deficit.      Coordination: Coordination normal.      Gait: Gait normal.   Psychiatric:         Mood and Affect: Mood normal.         Behavior: Behavior normal.         Result Review :                             Assessment and Plan      Diagnoses and all orders for this visit:    1. Need for influenza vaccination (Primary)  -     Fluzone High-Dose " 65+yrs    2. Generalized osteoarthritis  Assessment & Plan:  STABLE ON CURRENT MEDICATION.  CHASITY REVIEWED.  TOX SCREEN IS UP TO DATE.  THE CONTINUED USE OF A CONTROLLED SUBSTANCE IS NECESSARY FOR THIS PATIENT TO HAVE A NEAR NORMAL QUALITY OF LIFE AND WILL BE REVIEWED AT THE NEXT ROUTINE VISIT.      3. Essential hypertension  Assessment & Plan:  Hypertension is improving with treatment.  Continue current treatment regimen.  Continue current medications.  Blood pressure will be reassessed at the next regular appointment.      4. Elevated cholesterol  Assessment & Plan:  Lipid abnormalities are improving with lifestyle modifications.  Nutritional counseling was provided.  Lipids will be reassessed in 6 months.      5. Anxiety  Assessment & Plan:  IMPROVED WITH CURRENT TREATMENT, CONTINUE SAME, WILL REEVALUATE AT NEXT VISIT       6. Paroxysmal atrial fibrillation (HCC)  Comments:  CURRENTLY STABLE, PLANS PER CARDIOLOGY           Follow Up     Return in about 6 months (around 5/9/2023).    Patient was given instructions and counseling regarding his condition or for health maintenance advice. Please see specific information pulled into the AVS if appropriate.

## 2022-11-10 DIAGNOSIS — M15.9 GENERALIZED OSTEOARTHRITIS: ICD-10-CM

## 2022-11-10 RX ORDER — TRAMADOL HYDROCHLORIDE 50 MG/1
50 TABLET ORAL 2 TIMES DAILY PRN
Qty: 60 TABLET | Refills: 2 | Status: SHIPPED | OUTPATIENT
Start: 2022-11-10 | End: 2023-03-13 | Stop reason: SDUPTHER

## 2022-11-10 NOTE — TELEPHONE ENCOUNTER
Rx Refill Note  Requested Prescriptions     Pending Prescriptions Disp Refills   • traMADol (ULTRAM) 50 MG tablet 60 tablet 2     Sig: Take 1 tablet by mouth 2 (Two) Times a Day As Needed for Moderate Pain.      Last office visit with prescribing clinician: 11/9/2022      Next office visit with prescribing clinician: 5/10/2023  Last refill sent: 08/02/22, #60, 2 refills    Lily Vale LPN  11/10/22, 12:42 EST

## 2023-01-08 ENCOUNTER — APPOINTMENT (OUTPATIENT)
Dept: CARDIOLOGY | Facility: HOSPITAL | Age: 73
End: 2023-01-08
Payer: MEDICARE

## 2023-01-08 ENCOUNTER — APPOINTMENT (OUTPATIENT)
Dept: GENERAL RADIOLOGY | Facility: HOSPITAL | Age: 73
End: 2023-01-08
Payer: MEDICARE

## 2023-01-08 ENCOUNTER — HOSPITAL ENCOUNTER (EMERGENCY)
Facility: HOSPITAL | Age: 73
Discharge: HOME | End: 2023-01-08
Attending: EMERGENCY MEDICINE | Admitting: EMERGENCY MEDICINE
Payer: MEDICARE

## 2023-01-08 VITALS
TEMPERATURE: 98 F | WEIGHT: 211.42 LBS | HEART RATE: 58 BPM | OXYGEN SATURATION: 95 % | BODY MASS INDEX: 35.22 KG/M2 | RESPIRATION RATE: 18 BRPM | SYSTOLIC BLOOD PRESSURE: 107 MMHG | HEIGHT: 65 IN | DIASTOLIC BLOOD PRESSURE: 54 MMHG

## 2023-01-08 DIAGNOSIS — S42.291A OTHER CLOSED DISPLACED FRACTURE OF PROXIMAL END OF RIGHT HUMERUS, INITIAL ENCOUNTER: Primary | ICD-10-CM

## 2023-01-08 DIAGNOSIS — S51.801A AVULSION OF SKIN OF RIGHT FOREARM, INITIAL ENCOUNTER: ICD-10-CM

## 2023-01-08 LAB
ALBUMIN SERPL-MCNC: 3.3 G/DL (ref 3.5–5.2)
ALBUMIN/GLOB SERPL: 1.2 G/DL
ALP SERPL-CCNC: 104 U/L (ref 39–117)
ALT SERPL W P-5'-P-CCNC: 12 U/L (ref 1–41)
ANION GAP SERPL CALCULATED.3IONS-SCNC: 8 MMOL/L (ref 5–15)
AST SERPL-CCNC: 16 U/L (ref 1–40)
BASOPHILS # BLD AUTO: 0.06 10*3/MM3 (ref 0–0.2)
BASOPHILS NFR BLD AUTO: 0.8 % (ref 0–1.5)
BH CV UPPER VENOUS LEFT INTERNAL JUGULAR AUGMENT: NORMAL
BH CV UPPER VENOUS LEFT INTERNAL JUGULAR COMPRESS: NORMAL
BH CV UPPER VENOUS LEFT INTERNAL JUGULAR PHASIC: NORMAL
BH CV UPPER VENOUS LEFT INTERNAL JUGULAR SPONT: NORMAL
BH CV UPPER VENOUS LEFT SUBCLAVIAN AUGMENT: NORMAL
BH CV UPPER VENOUS LEFT SUBCLAVIAN COMPRESS: NORMAL
BH CV UPPER VENOUS LEFT SUBCLAVIAN PHASIC: NORMAL
BH CV UPPER VENOUS LEFT SUBCLAVIAN SPONT: NORMAL
BH CV UPPER VENOUS RIGHT AXILLARY COMPRESS: NORMAL
BH CV UPPER VENOUS RIGHT AXILLARY SPONT: NORMAL
BH CV UPPER VENOUS RIGHT BASILIC FOREARM COMPRESS: NORMAL
BH CV UPPER VENOUS RIGHT BRACHIAL COMPRESS: NORMAL
BH CV UPPER VENOUS RIGHT CEPHALIC FOREARM COMPRESS: NORMAL
BH CV UPPER VENOUS RIGHT CEPHALIC UPPER COMPRESS: NORMAL
BH CV UPPER VENOUS RIGHT INTERNAL JUGULAR AUGMENT: NORMAL
BH CV UPPER VENOUS RIGHT INTERNAL JUGULAR COMPRESS: NORMAL
BH CV UPPER VENOUS RIGHT INTERNAL JUGULAR PHASIC: NORMAL
BH CV UPPER VENOUS RIGHT INTERNAL JUGULAR SPONT: NORMAL
BH CV UPPER VENOUS RIGHT RADIAL COMPRESS: NORMAL
BH CV UPPER VENOUS RIGHT SUBCLAVIAN AUGMENT: NORMAL
BH CV UPPER VENOUS RIGHT SUBCLAVIAN COMPRESS: NORMAL
BH CV UPPER VENOUS RIGHT SUBCLAVIAN PHASIC: NORMAL
BH CV UPPER VENOUS RIGHT SUBCLAVIAN SPONT: NORMAL
BH CV UPPER VENOUS RIGHT ULNAR COMPRESS: NORMAL
BH CV VAS PRELIMINARY FINDINGS SCRIPTING: 1
BILIRUB SERPL-MCNC: 2.1 MG/DL (ref 0–1.2)
BUN SERPL-MCNC: 40 MG/DL (ref 8–23)
BUN/CREAT SERPL: 21.5 (ref 7–25)
CALCIUM SPEC-SCNC: 8.8 MG/DL (ref 8.6–10.5)
CHLORIDE SERPL-SCNC: 97 MMOL/L (ref 98–107)
CO2 SERPL-SCNC: 31 MMOL/L (ref 22–29)
CREAT SERPL-MCNC: 1.86 MG/DL (ref 0.76–1.27)
DEPRECATED RDW RBC AUTO: 56.2 FL (ref 37–54)
EGFRCR SERPLBLD CKD-EPI 2021: 38.2 ML/MIN/1.73
EOSINOPHIL # BLD AUTO: 0.5 10*3/MM3 (ref 0–0.4)
EOSINOPHIL NFR BLD AUTO: 6.3 % (ref 0.3–6.2)
ERYTHROCYTE [DISTWIDTH] IN BLOOD BY AUTOMATED COUNT: 13.8 % (ref 12.3–15.4)
GLOBULIN UR ELPH-MCNC: 2.8 GM/DL
GLUCOSE SERPL-MCNC: 99 MG/DL (ref 65–99)
HCT VFR BLD AUTO: 33.5 % (ref 37.5–51)
HGB BLD-MCNC: 11.1 G/DL (ref 13–17.7)
IMM GRANULOCYTES # BLD AUTO: 0.05 10*3/MM3 (ref 0–0.05)
IMM GRANULOCYTES NFR BLD AUTO: 0.6 % (ref 0–0.5)
LYMPHOCYTES # BLD AUTO: 1.18 10*3/MM3 (ref 0.7–3.1)
LYMPHOCYTES NFR BLD AUTO: 14.8 % (ref 19.6–45.3)
MACROCYTES BLD QL SMEAR: NORMAL
MAXIMAL PREDICTED HEART RATE: 149 BPM
MCH RBC QN AUTO: 37 PG (ref 26.6–33)
MCHC RBC AUTO-ENTMCNC: 33.1 G/DL (ref 31.5–35.7)
MCV RBC AUTO: 111.7 FL (ref 79–97)
MONOCYTES # BLD AUTO: 1.1 10*3/MM3 (ref 0.1–0.9)
MONOCYTES NFR BLD AUTO: 13.8 % (ref 5–12)
NEUTROPHILS NFR BLD AUTO: 5.08 10*3/MM3 (ref 1.7–7)
NEUTROPHILS NFR BLD AUTO: 63.7 % (ref 42.7–76)
NRBC BLD AUTO-RTO: 0 /100 WBC (ref 0–0.2)
PLAT MORPH BLD: NORMAL
PLATELET # BLD AUTO: 243 10*3/MM3 (ref 140–450)
PMV BLD AUTO: 8.5 FL (ref 6–12)
POTASSIUM SERPL-SCNC: 5.5 MMOL/L (ref 3.5–5.2)
PROT SERPL-MCNC: 6.1 G/DL (ref 6–8.5)
RBC # BLD AUTO: 3 10*6/MM3 (ref 4.14–5.8)
SODIUM SERPL-SCNC: 136 MMOL/L (ref 136–145)
STRESS TARGET HR: 127 BPM
WBC MORPH BLD: NORMAL
WBC NRBC COR # BLD: 7.97 10*3/MM3 (ref 3.4–10.8)

## 2023-01-08 PROCEDURE — 99283 EMERGENCY DEPT VISIT LOW MDM: CPT

## 2023-01-08 PROCEDURE — 96374 THER/PROPH/DIAG INJ IV PUSH: CPT

## 2023-01-08 PROCEDURE — 25010000002 HYDROMORPHONE 1 MG/ML SOLUTION: Performed by: EMERGENCY MEDICINE

## 2023-01-08 PROCEDURE — 80053 COMPREHEN METABOLIC PANEL: CPT | Performed by: REGISTERED NURSE

## 2023-01-08 PROCEDURE — 85007 BL SMEAR W/DIFF WBC COUNT: CPT | Performed by: REGISTERED NURSE

## 2023-01-08 PROCEDURE — 96376 TX/PRO/DX INJ SAME DRUG ADON: CPT

## 2023-01-08 PROCEDURE — 96375 TX/PRO/DX INJ NEW DRUG ADDON: CPT

## 2023-01-08 PROCEDURE — 73060 X-RAY EXAM OF HUMERUS: CPT

## 2023-01-08 PROCEDURE — 25010000002 ONDANSETRON PER 1 MG: Performed by: EMERGENCY MEDICINE

## 2023-01-08 PROCEDURE — 93971 EXTREMITY STUDY: CPT

## 2023-01-08 PROCEDURE — 85025 COMPLETE CBC W/AUTO DIFF WBC: CPT | Performed by: REGISTERED NURSE

## 2023-01-08 PROCEDURE — 93971 EXTREMITY STUDY: CPT | Performed by: SURGERY

## 2023-01-08 RX ORDER — OXYCODONE HYDROCHLORIDE AND ACETAMINOPHEN 5; 325 MG/1; MG/1
1 TABLET ORAL EVERY 6 HOURS PRN
Qty: 12 TABLET | Refills: 0 | Status: SHIPPED | OUTPATIENT
Start: 2023-01-08 | End: 2023-03-13

## 2023-01-08 RX ORDER — ONDANSETRON 2 MG/ML
4 INJECTION INTRAMUSCULAR; INTRAVENOUS ONCE
Status: COMPLETED | OUTPATIENT
Start: 2023-01-08 | End: 2023-01-08

## 2023-01-08 RX ORDER — CEPHALEXIN 500 MG/1
500 CAPSULE ORAL 3 TIMES DAILY
Qty: 21 CAPSULE | Refills: 0 | Status: SHIPPED | OUTPATIENT
Start: 2023-01-08 | End: 2023-03-13

## 2023-01-08 RX ADMIN — HYDROMORPHONE HYDROCHLORIDE 1 MG: 1 INJECTION, SOLUTION INTRAMUSCULAR; INTRAVENOUS; SUBCUTANEOUS at 18:58

## 2023-01-08 RX ADMIN — HYDROMORPHONE HYDROCHLORIDE 1 MG: 1 INJECTION, SOLUTION INTRAMUSCULAR; INTRAVENOUS; SUBCUTANEOUS at 21:46

## 2023-01-08 RX ADMIN — ONDANSETRON 4 MG: 2 INJECTION INTRAMUSCULAR; INTRAVENOUS at 18:58

## 2023-01-08 NOTE — ED PROVIDER NOTES
Time: 6:37 PM EST  Date of encounter:  1/8/2023  Independent Historian/Clinical History and Information was obtained by:   Patient  Chief Complaint: arm swelling    History is limited by: N/A    History of Present Illness:  Patient is a 72 y.o. year old male who presents to the emergency department for evaluation of arm swelling. Pt reports he suffered a fall on 12/30/22 and was dx with a R humeral fracture. Wife at bedside reports he saw an orthopedist on 1/5/22 and was told to go see an orthopedist at [facility]. She states he has been unable to see anyone at [facility] due to issues with the hospital he was seen at prior. He reports he has increased swelling and drainage from an abrasion to his R elbow. Wife states the abrasion was scabbed prior to the fall but was reopened due to the fall. Pt denies use of blood thinners. He also reports R upper arm pain and R shoulder pain.        HPI    Patient Care Team  Primary Care Provider: Esvin Gilliam MD    Past Medical History:     No Known Allergies  Past Medical History:   Diagnosis Date   • Depression    • Hypertension      Past Surgical History:   Procedure Laterality Date   • BACK SURGERY     • COLONOSCOPY  2015    NORMAL   • SPINE SURGERY       Family History   Problem Relation Age of Onset   • Cancer Father    • Cancer Brother        Home Medications:  Prior to Admission medications    Medication Sig Start Date End Date Taking? Authorizing Provider   amiodarone (PACERONE) 200 MG tablet  4/20/22   Ky Franklin MD   aspirin 81 MG EC tablet Take 81 mg by mouth Daily.    Ky Franklin MD   busPIRone (BUSPAR) 15 MG tablet Take 1 tablet by mouth 2 (Two) Times a Day for 180 days. 7/19/22 1/15/23  Esvin Gilliam MD   diclofenac (VOLTAREN) 75 MG EC tablet Take 1 tablet by mouth 2 (Two) Times a Day. 9/14/22   Esvin Gilliam MD   furosemide (LASIX) 20 MG tablet  11/15/21   Ky Franklin MD   metoprolol succinate XL (TOPROL-XL) 25  "MG 24 hr tablet Take 25 mg by mouth Daily. 21  ProviderKy MD   PARoxetine (PAXIL) 20 MG tablet Take 1 tablet by mouth Every Morning. 22   Esvin Gilliam MD   traMADol (ULTRAM) 50 MG tablet Take 1 tablet by mouth 2 (Two) Times a Day As Needed for Moderate Pain. 11/10/22   Esvin Gilliam MD   valsartan (DIOVAN) 320 MG tablet Take 320 mg by mouth Daily. 21  ProviderKy MD        Social History:   Social History     Tobacco Use   • Smoking status: Former     Packs/day: 0.10     Years: 20.00     Pack years: 2.00     Types: Cigarettes, Cigars     Quit date:      Years since quittin.0   • Smokeless tobacco: Never   Vaping Use   • Vaping Use: Never used   Substance Use Topics   • Alcohol use: Not Currently     Comment: ocassionally   • Drug use: Never         Review of Systems:  Review of Systems   Constitutional: Negative for chills and fever.   HENT: Negative for sore throat.    Eyes: Negative for photophobia.   Respiratory: Negative for shortness of breath.    Cardiovascular: Negative for chest pain.   Gastrointestinal: Negative for abdominal pain, diarrhea, nausea and vomiting.   Genitourinary: Negative for dysuria.   Musculoskeletal: Positive for arthralgias (R upper arm pain, R shoulder pain). Negative for neck pain.   Skin: Positive for wound (swelling and drainage from abrasion over R elbow).   Neurological: Negative for headaches.   All other systems reviewed and are negative.       Physical Exam:  /54   Pulse 58   Temp 98 °F (36.7 °C) (Oral)   Resp 18   Ht 165.1 cm (65\")   Wt 95.9 kg (211 lb 6.7 oz)   SpO2 95%   BMI 35.18 kg/m²     Physical Exam  Vitals and nursing note reviewed.   Constitutional:       General: He is not in acute distress.  HENT:      Head: Normocephalic and atraumatic.   Eyes:      Extraocular Movements: Extraocular movements intact.   Cardiovascular:      Rate and Rhythm: Normal rate and regular " rhythm.   Pulmonary:      Effort: Pulmonary effort is normal. No respiratory distress.      Breath sounds: Normal breath sounds.   Abdominal:      General: Abdomen is flat.      Palpations: Abdomen is soft.      Tenderness: There is no abdominal tenderness.   Musculoskeletal:         General: Normal range of motion.      Cervical back: Normal range of motion and neck supple.      Comments: Ecchymosis of entire R shoulder and arm extending to R hand, Ecchymosis on R chest wall and axillary region; Sensation intact; Pain with any movement   Skin:     General: Skin is warm and dry.      Capillary Refill: Capillary refill takes less than 2 seconds.   Neurological:      Mental Status: He is alert and oriented to person, place, and time. Mental status is at baseline.                  Procedures:  Procedures      Medical Decision Making:      Comorbidities that affect care:    Hypertension, Obesity    External Notes reviewed:    Hospital Discharge Summary and Previous Radiological Studies      The following orders were placed and all results were independently analyzed by me:  Orders Placed This Encounter   Procedures   • XR Humerus Right   • Comprehensive Metabolic Panel   • CBC Auto Differential   • Scan Slide   • CBC & Differential       Medications Given in the Emergency Department:  Medications   HYDROmorphone (DILAUDID) injection 1 mg (1 mg Intravenous Given 1/8/23 1858)   ondansetron (ZOFRAN) injection 4 mg (4 mg Intravenous Given 1/8/23 1858)   HYDROmorphone (DILAUDID) injection 1 mg (1 mg Intravenous Given 1/8/23 2146)        ED Course:    ED Course as of 01/13/23 0250   Sun Jan 08, 2023   1441 CT SCAN RIGHT SHOULDER.     1/2/2023 12:48 PM     HISTORY: Right shoulder pain, recent fall.     COMPARISON: None.     PROCEDURE: Axial images were obtained through the right shoulder by   computed tomography.  Sagittal and coronal reconstruction images were   performed . This study was performed with techniques to keep  radiation   doses as low as reasonably achievable, (ALARA). Individualized dose   reduction techniques using automated exposure control or adjustment of   mA and/or kV according to the patient size were employed.     FINDINGS: There is a severely comminuted displaced fracture of the   proximal right humerus. There is a full shaft width displacement of the   distal fracture fragment. There is displacement laterally and   anteriorly. The large comminuted fragment measures approximate 4.5 cm.   The glenohumeral joint is preserved. The humeral head is located. The   limited images of the ribs demonstrate no displaced right rib fracture.   [CW]      ED Course User Index  [CW] Maxine Hanley APRN       Labs:    Lab Results (last 24 hours)     ** No results found for the last 24 hours. **           Imaging:    No Radiology Exams Resulted Within Past 24 Hours      Differential Diagnosis and Discussion:    Extremity Pain: Differential diagnosis includes but is not limited to soft tissue sprain, tendonitis, tendon injury, dislocation, fracture, deep vein thrombosis, arterial insufficiency, osteoarthritis, bursitis, and ligamentous damage.    All labs were reviewed and analyzed by me.  Ultrasound interpretation was reviewed by me.     MDM         Patient Care Considerations:    I considered ordering a CT evaluation of the right shoulder, however, chart review showed CT scan had recently been done.      Consultants/Shared Management Plan:    Consultant: I have discussed the case with Dr. Ng, orthopedic surgery, who agrees to consult on the patient.    Social Determinants of Health:    Patient is independent, reliable, and has access to care.       Disposition and Care Coordination:    Discharged: I considered escalation of care by admitting this patient to the hospital, however the patient has improved and is suitable and stable for discharge.    I have explained discharge medications and the need for follow up with the  patient/caretakers. This was also printed in the discharge instructions. Patient was discharged with the following medications and follow up:      Medication List      New Prescriptions    cephalexin 500 MG capsule  Commonly known as: KEFLEX  Take 1 capsule by mouth 3 (Three) Times a Day.     oxyCODONE-acetaminophen 5-325 MG per tablet  Commonly known as: PERCOCET  Take 1 tablet by mouth Every 6 (Six) Hours As Needed for Severe Pain.           Where to Get Your Medications      These medications were sent to [id number] - [place], [place] - [address] - [phone]  - [phone] FX  [address], [place] [address]    Phone: [phone]   · cephalexin 500 MG capsule  · oxyCODONE-acetaminophen 5-325 MG per tablet      Kali Ng MD  [address]  [place] [address] [phone]    Schedule an appointment as soon as possible for a visit       Esvin Gilliam MD  [address]  [address]  [facility] [id number] [phone]    Schedule an appointment as soon as possible for a visit          Final diagnoses:   Other closed displaced fracture of proximal end of right humerus, initial encounter   Avulsion of skin of right forearm, initial encounter        ED Disposition     ED Disposition   Discharge    Condition   Stable    Comment   --             This medical record created using voice recognition software.        Documentation assistance provided by Gary Sifuentes acting as scribe for Kyle Early MD. Information recorded by the scribe was done at my direction and has been verified and validated by me.       Gary Sifuentes  01/08/23 [id number]       Kyle Early DO  01/13/23 0255

## 2023-01-09 ENCOUNTER — TELEPHONE (OUTPATIENT)
Dept: ORTHOPEDIC SURGERY | Facility: CLINIC | Age: 73
End: 2023-01-09
Payer: MEDICARE
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
## 2023-01-25 NOTE — ASSESSMENT & PLAN NOTE
EMR reflects Guanfacine Intuniv 1 mg tablet restarted 1/24/23 Sig: Give 1 po at 4 pm x 1 wk then 1 tab  2x / day thereafter.    Refill denied, pending confirmation from Provider.   IMPROVED WITH CURRENT TREATMENT, CONTINUE SAME, WILL REEVALUATE AT NEXT VISIT

## 2023-01-30 ENCOUNTER — TRANSITIONAL CARE MANAGEMENT TELEPHONE ENCOUNTER (OUTPATIENT)
Dept: CALL CENTER | Facility: HOSPITAL | Age: 73
End: 2023-01-30
Payer: MEDICARE

## 2023-01-30 ENCOUNTER — TELEPHONE (OUTPATIENT)
Dept: FAMILY MEDICINE CLINIC | Age: 73
End: 2023-01-30
Payer: MEDICARE

## 2023-01-30 ENCOUNTER — READMISSION MANAGEMENT (OUTPATIENT)
Dept: CALL CENTER | Facility: HOSPITAL | Age: 73
End: 2023-01-30
Payer: MEDICARE

## 2023-01-30 NOTE — OUTREACH NOTE
Call Center TCM Note    Flowsheet Row Responses   [facility] patient discharged from? Non-BH   Does the patient have one of the following disease processes/diagnoses(primary or secondary)? Other   TCM attempt successful? No   Unsuccessful attempts Attempt 2          Maude Burks RN    1/30/2023, 13:50 EST

## 2023-01-30 NOTE — TELEPHONE ENCOUNTER
Patient was discharged from [facility] on 01/26/2023. He said he was in there for surgery on his shoulder.

## 2023-01-30 NOTE — OUTREACH NOTE
Call Center TCM Note    Flowsheet Row Responses   [facility] patient discharged from? Non-  [facility]   Does the patient have one of the following disease processes/diagnoses(primary or secondary)? Other   TCM attempt successful? No   Unsuccessful attempts Attempt 1          Maude Burks RN    1/30/2023, 13:02 EST

## 2023-01-30 NOTE — OUTREACH NOTE
Prep Survey    Flowsheet Row Responses   [facility] facility patient discharged from? Non-BH   Is LACE score < 7 ? Non-BH Discharge   Eligibility [facility]   Date of Discharge 01/26/23   Discharge Disposition Home or Self Care   Discharge diagnosis Shoulder surgery   Does the patient have one of the following disease processes/diagnoses(primary or secondary)? General Surgery   Prep survey completed? Yes          PEREZ YEE - Registered Nurse

## 2023-01-31 ENCOUNTER — TRANSITIONAL CARE MANAGEMENT TELEPHONE ENCOUNTER (OUTPATIENT)
Dept: CALL CENTER | Facility: HOSPITAL | Age: 73
End: 2023-01-31
Payer: MEDICARE

## 2023-01-31 NOTE — OUTREACH NOTE
Call Center TCM Note    Flowsheet Row Responses   [facility] facility patient discharged from? Non-BH   Does the patient have one of the following disease processes/diagnoses(primary or secondary)? Other   TCM attempt successful? No   Unsuccessful attempts Attempt 3  [No answer.]          Eliza Parsons RN    1/31/2023, 08:45 EST

## 2023-03-11 ENCOUNTER — DOCUMENTATION (OUTPATIENT)
Dept: FAMILY MEDICINE CLINIC | Age: 73
End: 2023-03-11
Payer: MEDICARE
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
## 2023-03-13 ENCOUNTER — OFFICE VISIT (OUTPATIENT)
Dept: FAMILY MEDICINE CLINIC | Age: 73
End: 2023-03-13
Payer: MEDICARE

## 2023-03-13 VITALS
HEIGHT: 65 IN | DIASTOLIC BLOOD PRESSURE: 61 MMHG | SYSTOLIC BLOOD PRESSURE: 119 MMHG | BODY MASS INDEX: 32.02 KG/M2 | HEART RATE: 56 BPM | WEIGHT: 192.2 LBS

## 2023-03-13 DIAGNOSIS — I10 ESSENTIAL HYPERTENSION: ICD-10-CM

## 2023-03-13 DIAGNOSIS — M15.9 GENERALIZED OSTEOARTHRITIS: ICD-10-CM

## 2023-03-13 DIAGNOSIS — F41.9 ANXIETY: ICD-10-CM

## 2023-03-13 DIAGNOSIS — S12.601A CLOSED NONDISPLACED FRACTURE OF SEVENTH CERVICAL VERTEBRA, UNSPECIFIED FRACTURE MORPHOLOGY, INITIAL ENCOUNTER: Primary | ICD-10-CM

## 2023-03-13 DIAGNOSIS — S42.291A: ICD-10-CM

## 2023-03-13 PROCEDURE — 3074F SYST BP LT 130 MM HG: CPT | Performed by: FAMILY MEDICINE

## 2023-03-13 PROCEDURE — 1159F MED LIST DOCD IN RCRD: CPT | Performed by: FAMILY MEDICINE

## 2023-03-13 PROCEDURE — 3078F DIAST BP <80 MM HG: CPT | Performed by: FAMILY MEDICINE

## 2023-03-13 PROCEDURE — 1111F DSCHRG MED/CURRENT MED MERGE: CPT | Performed by: FAMILY MEDICINE

## 2023-03-13 PROCEDURE — 99214 OFFICE O/P EST MOD 30 MIN: CPT | Performed by: FAMILY MEDICINE

## 2023-03-13 PROCEDURE — 1160F RVW MEDS BY RX/DR IN RCRD: CPT | Performed by: FAMILY MEDICINE

## 2023-03-13 RX ORDER — CYCLOBENZAPRINE HCL 5 MG
1 TABLET ORAL 2 TIMES DAILY
COMMUNITY
Start: 2023-02-07

## 2023-03-13 RX ORDER — NAPROXEN 250 MG/1
TABLET ORAL
COMMUNITY
Start: 2023-01-26

## 2023-03-13 RX ORDER — OXYCODONE HYDROCHLORIDE 5 MG/1
1 TABLET ORAL EVERY 6 HOURS PRN
COMMUNITY
Start: 2023-03-09 | End: 2023-03-13

## 2023-03-13 RX ORDER — TRAMADOL HYDROCHLORIDE 50 MG/1
50 TABLET ORAL 2 TIMES DAILY PRN
Qty: 60 TABLET | Refills: 2 | Status: SHIPPED | OUTPATIENT
Start: 2023-03-13

## 2023-03-13 NOTE — PROGRESS NOTES
Transitional Care Follow Up Visit  Subjective     Xavier Polanco is a 72 y.o. male who presents for a transitional care management visit.    Within 48 business hours after discharge our office contacted him via telephone to coordinate his care and needs.      I reviewed and discussed the details of that call along with the discharge summary, hospital problems, inpatient lab results, inpatient diagnostic studies, and consultation reports with Xavier.     Current outpatient and discharge medications have been reconciled for the patient.  Reviewed by: Esvin Gilliam MD      Date of TCM Phone Call 1/30/2023   [facility]   Date of Discharge 1/26/2023   Discharge Disposition Home or Self Care     Risk for Readmission (LACE) No data recorded    History of Present Illness  --TOLERATING BLOOD PRESSURE MEDICATION WITHOUT APPARENT SIDE EFFECTS  --ANXIETY IS STABLE ON THE BUSPAR  --CONTINUES ON ROUTINE TRAMADOL FOR CHRONIC O.A. PAIN, STABLE  --RECENTLY SUSTAINED BOTH A RIGHT PROXIMAL HUMERAL FRACTURE AND A FRACTURE OF C-7 AND C-8 FOR WHICH HE HAD SURGERY AND WENT TO REHAB.  WILL F/U WITH ORTHO AND NEUROSURGERY IN THE NEXT FEW DAY.  HAD BEEN ON SOME OXYCODONE BUT WANTS TO JUST GO BACK TO THE ROUTINE TRAMADOL AS MENTIONED ABOVE      Course During Hospital Stay:  IMPROVED     The following portions of the patient's history were reviewed and updated as appropriate: allergies, current medications, past family history, past medical history, past social history, past surgical history and problem list.    Review of Systems   Constitutional: Negative for activity change, appetite change, chills, fatigue and fever.   HENT: Negative for congestion, ear pain and sneezing.    Eyes: Negative for discharge.   Respiratory: Negative for cough and shortness of breath.    Cardiovascular: Negative for chest pain, palpitations and leg swelling.   Gastrointestinal: Negative for abdominal pain, diarrhea, nausea and  vomiting.   Genitourinary: Negative for dysuria and hematuria.   Musculoskeletal: Negative for arthralgias and myalgias.   Neurological: Negative for headaches.   Psychiatric/Behavioral: Negative for dysphoric mood.       Objective   Physical Exam  Vitals and nursing note reviewed.   Constitutional:       General: He is not in acute distress.     Appearance: Normal appearance.   Cardiovascular:      Rate and Rhythm: Normal rate and regular rhythm.      Heart sounds: Normal heart sounds. No murmur heard.  Pulmonary:      Effort: Pulmonary effort is normal.      Breath sounds: Normal breath sounds.   Abdominal:      Palpations: Abdomen is soft.      Tenderness: There is no abdominal tenderness.   Musculoskeletal:      Cervical back: Neck supple.      Right lower leg: No edema.      Left lower leg: No edema.   Lymphadenopathy:      Cervical: No cervical adenopathy.   Neurological:      General: No focal deficit present.      Mental Status: He is alert.      Cranial Nerves: No cranial nerve deficit.      Coordination: Coordination normal.      Gait: Gait normal.   Psychiatric:         Mood and Affect: Mood normal.         Behavior: Behavior normal.         Assessment & Plan   Diagnoses and all orders for this visit:    1. Closed nondisplaced fracture of seventh cervical vertebra, unspecified fracture morphology, initial encounter ([facility]) (Primary)  Comments:  HOSPITAL RECORDS REVIEWED, PLANS PER NEUROSURGERY     2. Essential hypertension  Assessment & Plan:  Hypertension is improving with treatment.  Continue current treatment regimen.  Continue current medications.  Blood pressure will be reassessed in 3 months.      3. Generalized osteoarthritis  Assessment & Plan:  STABLE ON CURRENT MEDICATION.  CHASITY REVIEWED.  TOX SCREEN IS UP TO DATE.  THE CONTINUED USE OF A CONTROLLED SUBSTANCE IS NECESSARY FOR THIS PATIENT TO HAVE A NEAR NORMAL QUALITY OF LIFE AND WILL BE REVIEWED AT THE NEXT ROUTINE VISIT.    Orders:  -      traMADol (ULTRAM) 50 MG tablet; Take 1 tablet by mouth 2 (Two) Times a Day As Needed for Moderate Pain.  Dispense: 60 tablet; Refill: 2    4. Anxiety  Assessment & Plan:  TOLERATING BLOOD PRESSURE MEDICATION WITHOUT APPARENT SIDE EFFECTS      5. Fracture of head of humerus, right, closed, initial encounter  Comments:  HOSPITAL RECORDS REVIEWED, PLANS PER ORTHO

## 2023-03-15 ENCOUNTER — TELEPHONE (OUTPATIENT)
Dept: FAMILY MEDICINE CLINIC | Age: 73
End: 2023-03-15
Payer: MEDICARE
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
## 2023-03-31 DIAGNOSIS — M15.9 GENERALIZED OSTEOARTHRITIS: ICD-10-CM

## 2023-04-03 NOTE — TELEPHONE ENCOUNTER
Rx Refill Note  Requested Prescriptions     Pending Prescriptions Disp Refills   • diclofenac (VOLTAREN) 75 MG EC tablet [Pharmacy Med Name: DICLOFENAC SOD EC 75 MG TAB] 180 tablet 0     Sig: TAKE ONE TABLET BY MOUTH TWICE A DAY      Last office visit with prescribing clinician: 3/13/2023   Last telemedicine visit with prescribing clinician: 5/10/2023   Next office visit with prescribing clinician: 5/10/2023    Do not see diclofenac on his current med list.  Naproxen 250mg was given #28 tablets, 7 day supply by Dr Jerri Fried 01/26/23    Lily Vale LPN  04/03/23, 13:47 EDT

## 2023-04-04 RX ORDER — DICLOFENAC SODIUM 75 MG/1
TABLET, DELAYED RELEASE ORAL
Qty: 180 TABLET | Refills: 0 | Status: SHIPPED | OUTPATIENT
Start: 2023-04-04

## 2023-05-10 ENCOUNTER — OFFICE VISIT (OUTPATIENT)
Dept: FAMILY MEDICINE CLINIC | Age: 73
End: 2023-05-10
Payer: MEDICARE

## 2023-05-10 ENCOUNTER — LAB (OUTPATIENT)
Dept: LAB | Facility: HOSPITAL | Age: 73
End: 2023-05-10
Payer: MEDICARE

## 2023-05-10 VITALS
SYSTOLIC BLOOD PRESSURE: 160 MMHG | BODY MASS INDEX: 32.92 KG/M2 | OXYGEN SATURATION: 96 % | WEIGHT: 197.6 LBS | HEART RATE: 74 BPM | HEIGHT: 65 IN | DIASTOLIC BLOOD PRESSURE: 79 MMHG

## 2023-05-10 DIAGNOSIS — Z11.59 ENCOUNTER FOR SCREENING FOR OTHER VIRAL DISEASES: ICD-10-CM

## 2023-05-10 DIAGNOSIS — I10 ESSENTIAL HYPERTENSION: ICD-10-CM

## 2023-05-10 DIAGNOSIS — Z00.00 MEDICARE ANNUAL WELLNESS VISIT, SUBSEQUENT: Primary | ICD-10-CM

## 2023-05-10 DIAGNOSIS — E78.00 ELEVATED CHOLESTEROL: ICD-10-CM

## 2023-05-10 DIAGNOSIS — F41.9 ANXIETY: ICD-10-CM

## 2023-05-10 DIAGNOSIS — Z79.899 HIGH RISK MEDICATION USE: ICD-10-CM

## 2023-05-10 DIAGNOSIS — Z12.5 SCREENING FOR PROSTATE CANCER: ICD-10-CM

## 2023-05-10 DIAGNOSIS — R39.11 URINARY HESITANCY: ICD-10-CM

## 2023-05-10 DIAGNOSIS — M15.9 GENERALIZED OSTEOARTHRITIS: ICD-10-CM

## 2023-05-10 LAB
ALBUMIN SERPL-MCNC: 3.4 G/DL (ref 3.5–5.2)
ALBUMIN/GLOB SERPL: 1.1 G/DL
ALP SERPL-CCNC: 220 U/L (ref 39–117)
ALT SERPL W P-5'-P-CCNC: 19 U/L (ref 1–41)
AMPHET+METHAMPHET UR QL: NEGATIVE
AMPHETAMINES UR QL: NEGATIVE
ANION GAP SERPL CALCULATED.3IONS-SCNC: 7 MMOL/L (ref 5–15)
AST SERPL-CCNC: 31 U/L (ref 1–40)
BARBITURATES UR QL SCN: NEGATIVE
BENZODIAZ UR QL SCN: NEGATIVE
BILIRUB SERPL-MCNC: 0.6 MG/DL (ref 0–1.2)
BUN SERPL-MCNC: 8 MG/DL (ref 8–23)
BUN/CREAT SERPL: 8.2 (ref 7–25)
BUPRENORPHINE SERPL-MCNC: NEGATIVE NG/ML
CALCIUM SPEC-SCNC: 8.2 MG/DL (ref 8.6–10.5)
CANNABINOIDS SERPL QL: NEGATIVE
CHLORIDE SERPL-SCNC: 98 MMOL/L (ref 98–107)
CHOLEST SERPL-MCNC: 125 MG/DL (ref 0–200)
CO2 SERPL-SCNC: 35 MMOL/L (ref 22–29)
COCAINE UR QL: NEGATIVE
CREAT SERPL-MCNC: 0.97 MG/DL (ref 0.76–1.27)
DEPRECATED RDW RBC AUTO: 68.1 FL (ref 37–54)
EGFRCR SERPLBLD CKD-EPI 2021: 83.5 ML/MIN/1.73
ERYTHROCYTE [DISTWIDTH] IN BLOOD BY AUTOMATED COUNT: 18.8 % (ref 12.3–15.4)
EXPIRATION DATE: NORMAL
GLOBULIN UR ELPH-MCNC: 3 GM/DL
GLUCOSE SERPL-MCNC: 110 MG/DL (ref 65–99)
HCT VFR BLD AUTO: 38.8 % (ref 37.5–51)
HCV AB SER DONR QL: NORMAL
HDLC SERPL-MCNC: 43 MG/DL (ref 40–60)
HGB BLD-MCNC: 12 G/DL (ref 13–17.7)
LDLC SERPL CALC-MCNC: 67 MG/DL (ref 0–100)
LDLC/HDLC SERPL: 1.56 {RATIO}
Lab: NORMAL
MCH RBC QN AUTO: 30.9 PG (ref 26.6–33)
MCHC RBC AUTO-ENTMCNC: 30.9 G/DL (ref 31.5–35.7)
MCV RBC AUTO: 100 FL (ref 79–97)
MDMA UR QL SCN: NEGATIVE
METHADONE UR QL SCN: NEGATIVE
OPIATES UR QL: NEGATIVE
OXYCODONE UR QL SCN: NEGATIVE
PCP UR QL SCN: NEGATIVE
PLATELET # BLD AUTO: 177 10*3/MM3 (ref 140–450)
PMV BLD AUTO: 8.6 FL (ref 6–12)
POTASSIUM SERPL-SCNC: 4 MMOL/L (ref 3.5–5.2)
PROT SERPL-MCNC: 6.4 G/DL (ref 6–8.5)
PSA SERPL-MCNC: 0.58 NG/ML (ref 0–4)
RBC # BLD AUTO: 3.88 10*6/MM3 (ref 4.14–5.8)
SODIUM SERPL-SCNC: 140 MMOL/L (ref 136–145)
TRIGL SERPL-MCNC: 74 MG/DL (ref 0–150)
TSH SERPL DL<=0.05 MIU/L-ACNC: 2.65 UIU/ML (ref 0.27–4.2)
VLDLC SERPL-MCNC: 15 MG/DL (ref 5–40)
WBC NRBC COR # BLD: 8.59 10*3/MM3 (ref 3.4–10.8)

## 2023-05-10 PROCEDURE — 80061 LIPID PANEL: CPT | Performed by: FAMILY MEDICINE

## 2023-05-10 PROCEDURE — 86803 HEPATITIS C AB TEST: CPT | Performed by: FAMILY MEDICINE

## 2023-05-10 PROCEDURE — 84443 ASSAY THYROID STIM HORMONE: CPT | Performed by: FAMILY MEDICINE

## 2023-05-10 PROCEDURE — G0103 PSA SCREENING: HCPCS | Performed by: FAMILY MEDICINE

## 2023-05-10 PROCEDURE — 80053 COMPREHEN METABOLIC PANEL: CPT | Performed by: FAMILY MEDICINE

## 2023-05-10 PROCEDURE — 85027 COMPLETE CBC AUTOMATED: CPT | Performed by: FAMILY MEDICINE

## 2023-05-10 PROCEDURE — 36415 COLL VENOUS BLD VENIPUNCTURE: CPT | Performed by: FAMILY MEDICINE

## 2023-05-10 RX ORDER — ASPIRIN 81 MG/1
81 TABLET ORAL DAILY
COMMUNITY

## 2023-05-10 RX ORDER — FUROSEMIDE 20 MG/1
1 TABLET ORAL DAILY
COMMUNITY
Start: 2023-03-31

## 2023-05-10 RX ORDER — PAROXETINE HYDROCHLORIDE 20 MG/1
20 TABLET, FILM COATED ORAL DAILY
COMMUNITY
Start: 2023-01-10 | End: 2023-05-12

## 2023-05-10 RX ORDER — AMLODIPINE BESYLATE 5 MG/1
5 TABLET ORAL DAILY
Qty: 30 TABLET | Refills: 11 | COMMUNITY
Start: 2023-04-26 | End: 2024-04-25

## 2023-05-10 NOTE — ASSESSMENT & PLAN NOTE
ADVICE GIVEN RE:  SEATBELT USE, ALCOHOL USE, HEALTHY DIET, ROUTINE EYE AND DENTAL EXAM, ROUTINE VACCINATIONS.    STATES HE CANNOT AFFORD A SHINGLES SHOT

## 2023-05-10 NOTE — ASSESSMENT & PLAN NOTE
Hypertension is worsening .  Continue current treatment regimen.  Continue current medications.  Blood pressure will be reassessed at the next regular appointment.  NOT AT GOAL TODAY, HE WILL F/U WITH CARDIOLOGY AS PLANNED

## 2023-05-10 NOTE — PROGRESS NOTES
The ABCs of the Annual Wellness Visit  Subsequent Medicare Wellness Visit    Subjective    Xavier Polanco is a 72 y.o. male who presents for a Subsequent Medicare Wellness Visit.    The following portions of the patient's history were reviewed and   updated as appropriate: allergies, current medications, past family history, past medical history, past social history, past surgical history and problem list.    Compared to one year ago, the patient feels his physical   health is the same.    Compared to one year ago, the patient feels his mental   health is the same.    Recent Hospitalizations:  He was admitted within the past 365 days at [facility].       Current Medical Providers:  Patient Care Team:  Esvin Gilliam MD as PCP - General (Family Medicine)    Outpatient Medications Prior to Visit   Medication Sig Dispense Refill   • amiodarone (PACERONE) 200 MG tablet Take 1 tablet by mouth Daily.     • amLODIPine (NORVASC) 5 MG tablet Take 1 tablet by mouth Daily. 30 tablet 11   • aspirin (aspirin EC) 81 MG EC tablet Take 1 tablet by mouth Daily.     • busPIRone (BUSPAR) 15 MG tablet Take 1 tablet by mouth 2 (Two) Times a Day for 180 days. 180 tablet 1   • diclofenac (VOLTAREN) 75 MG EC tablet TAKE ONE TABLET BY MOUTH TWICE A  tablet 0   • furosemide (LASIX) 20 MG tablet Take 1 tablet by mouth Daily.     • PARoxetine (PAXIL) 20 MG tablet Take 1 tablet by mouth Daily.     • traMADol (ULTRAM) 50 MG tablet Take 1 tablet by mouth 2 (Two) Times a Day As Needed for Moderate Pain. 60 tablet 2   • valsartan (DIOVAN) 320 MG tablet Take 1 tablet by mouth Daily.     • metoprolol succinate XL (TOPROL-XL) 25 MG 24 hr tablet Take 25 mg by mouth Daily.     • aspirin 81 MG EC tablet Take 1 tablet by mouth Daily.     • cyclobenzaprine (FLEXERIL) 5 MG tablet Take 1 tablet by mouth 2 (Two) Times a Day.     • naproxen (NAPROSYN) 250 MG tablet      • Sennosides-Docusate Sodium (SENOKOT S PO)   1 Tab, Oral, Tab,  "BID, 0 Refill(s)       No facility-administered medications prior to visit.       Opioid medication/s are on active medication list.  and I have evaluated his active treatment plan and pain score trends (see table).  Vitals:    05/10/23 1343   PainSc: 0-No pain     I have reviewed the chart for potential of high risk medication and harmful drug interactions in the elderly.            Aspirin is on active medication list. Aspirin use is indicated based on review of current medical condition/s. Pros and cons of this therapy have been discussed today. Benefits of this medication outweigh potential harm.  Patient has been encouraged to continue taking this medication.  .      Patient Active Problem List   Diagnosis   • Essential hypertension   • Elevated cholesterol   • Anxiety   • Paroxysmal atrial fibrillation   • Obstructive sleep apnea (cannot tolerate CPAP)    • Generalized osteoarthritis   • Medicare annual wellness visit, subsequent   • Urinary hesitancy     Advance Care Planning   Advance Care Planning     Advance Directive is not on file.  ACP discussion was held with the patient during this visit. Patient does not have an advance directive, information provided.     Objective    Vitals:    05/10/23 1343 05/10/23 1346   BP: 161/84 160/79   BP Location: Left arm Left arm   Patient Position: Sitting Sitting   Pulse: 73 74   SpO2: 96%  Comment: on room air    Weight: 89.6 kg (197 lb 9.6 oz)    Height: 165.1 cm (65\")    PainSc: 0-No pain      Estimated body mass index is 32.88 kg/m² as calculated from the following:    Height as of this encounter: 165.1 cm (65\").    Weight as of this encounter: 89.6 kg (197 lb 9.6 oz).    BMI is >= 30 and <35. (Class 1 Obesity). The following options were offered after discussion;: nutrition counseling/recommendations      Does the patient have evidence of cognitive impairment? No          HEALTH RISK ASSESSMENT    Smoking Status:  Social History     Tobacco Use   Smoking Status " Former   • Types: Cigars   • Quit date:    • Years since quittin.3   Smokeless Tobacco Never     Alcohol Consumption:  Social History     Substance and Sexual Activity   Alcohol Use Not Currently    Comment: ocassionally     Fall Risk Screen:    MAURO Fall Risk Assessment was completed, and patient is at HIGH risk for falls. Assessment completed on:5/10/2023    Depression Screenin/10/2023     1:41 PM   PHQ-2/PHQ-9 Depression Screening   Little Interest or Pleasure in Doing Things 0-->not at all   Feeling Down, Depressed or Hopeless 0-->not at all   PHQ-9: Brief Depression Severity Measure Score 0       Health Habits and Functional and Cognitive Screenin/10/2023     1:41 PM   Functional & Cognitive Status   Do you have difficulty preparing food and eating? No   Do you have difficulty bathing yourself, getting dressed or grooming yourself? No   Do you have difficulty using the toilet? No   Do you have difficulty moving around from place to place? No   Do you have trouble with steps or getting out of a bed or a chair? No   Current Diet Frequent Junk Food   Dental Exam Up to date   Eye Exam Up to date   Exercise (times per week) 7 times per week   Current Exercises Include Other   Do you need help using the phone?  No   Are you deaf or do you have serious difficulty hearing?  No   Do you need help with transportation? No   Do you need help shopping? No   Do you need help preparing meals?  No   Do you need help with housework?  Yes   Do you need help with laundry? Yes   Do you need help taking your medications? No   Do you need help managing money? No   Do you ever drive or ride in a car without wearing a seat belt? No   Have you felt unusual stress, anger or loneliness in the last month? No   Who do you live with? Child   If you need help, do you have trouble finding someone available to you? No   Do you have difficulty concentrating, remembering or making decisions? Yes       Age-appropriate  Screening Schedule:  Refer to the list below for future screening recommendations based on patient's age, sex and/or medical conditions. Orders for these recommended tests are listed in the plan section. The patient has been provided with a written plan.    Health Maintenance   Topic Date Due   • HEPATITIS C SCREENING  Never done   • ZOSTER VACCINE (1 of 2) 05/10/2023 (Originally 11/6/2001)   • COVID-19 Vaccine (5 - Booster for Moderna series) 05/12/2023 (Originally 6/15/2021)   • LIPID PANEL  05/12/2023   • INFLUENZA VACCINE  08/01/2023   • ANNUAL WELLNESS VISIT  05/10/2024   • COLORECTAL CANCER SCREENING  05/04/2025   • TDAP/TD VACCINES (2 - Td or Tdap) 01/30/2033   • Pneumococcal Vaccine 65+  Completed   • AAA SCREEN (ONE-TIME)  Discontinued                  CMS Preventative Services Quick Reference  Risk Factors Identified During Encounter  None Identified  The above risks/problems have been discussed with the patient.  Pertinent information has been shared with the patient in the After Visit Summary.  An After Visit Summary and PPPS were made available to the patient.    Follow Up:   Next Medicare Wellness visit to be scheduled in 1 year.       Additional E&M Note during same encounter follows:  Patient has multiple medical problems which are significant and separately identifiable that require additional work above and beyond the Medicare Wellness Visit.      Chief Complaint  Medicare Wellness-subsequent    Subjective        --TOLERATING BLOOD PRESSURE MEDICATION WITHOUT APPARENT SIDE EFFECTS, STATES BP IS UP AND DOWN AT HOME, CARDIOLOGY IS ADJUSTING HIS MEDS  --LAST LIPIDS WERE OK WITH DIETARY EFFORTS ONLY  --THE CHRONIC ANXIETY IS DOING WELL WITH THE WELLBUTRIN  --CONTINUES ON ROUTINE TRAMADOL FOR CHRONIC JOINT PAIN, STABLE  --HAS NOTED SOME URINARY HESITANCY RECENTLY, NO NOCTURIA OR DYSURIA    Xavier Polanco is also being seen today for BLOOD PRESSURE, CHOLESTEROL, JOINT PAIN, ANXIETY, URINARY  "ISSUES    Review of Systems   Constitutional: Negative for activity change, appetite change, chills, fatigue and fever.   HENT: Negative for congestion, ear pain, hearing loss, rhinorrhea and sore throat.    Eyes: Negative for discharge and visual disturbance.   Respiratory: Negative for cough and shortness of breath.    Cardiovascular: Negative for chest pain, palpitations and leg swelling.   Gastrointestinal: Negative for abdominal pain, nausea and vomiting.   Genitourinary: Negative for dysuria and hematuria.   Musculoskeletal: Positive for arthralgias. Negative for myalgias.   Psychiatric/Behavioral: Negative for dysphoric mood.       Objective   Vital Signs:  /79 (BP Location: Left arm, Patient Position: Sitting)   Pulse 74   Ht 165.1 cm (65\")   Wt 89.6 kg (197 lb 9.6 oz)   SpO2 96% Comment: on room air  BMI 32.88 kg/m²     Physical Exam  Vitals and nursing note reviewed.   Constitutional:       General: He is not in acute distress.     Appearance: Normal appearance.   HENT:      Right Ear: Tympanic membrane normal.      Left Ear: Tympanic membrane normal.      Mouth/Throat:      Pharynx: Oropharynx is clear.   Eyes:      Conjunctiva/sclera: Conjunctivae normal.   Cardiovascular:      Rate and Rhythm: Normal rate and regular rhythm.      Heart sounds: Normal heart sounds. No murmur heard.  Pulmonary:      Effort: Pulmonary effort is normal.      Breath sounds: Normal breath sounds.   Abdominal:      General: Bowel sounds are normal.      Palpations: Abdomen is soft.      Tenderness: There is no abdominal tenderness.   Musculoskeletal:      Cervical back: Neck supple.      Right lower leg: No edema.      Left lower leg: No edema.   Lymphadenopathy:      Cervical: No cervical adenopathy.   Neurological:      General: No focal deficit present.      Mental Status: He is alert.      Cranial Nerves: No cranial nerve deficit.      Coordination: Coordination normal.      Gait: Gait normal.   Psychiatric:    "      Mood and Affect: Mood normal.         Behavior: Behavior normal.          The following data was reviewed by: Esvin Gilliam MD on 05/10/2023:  Common labs        8/5/2022    13:37 1/8/2023    15:09   Common Labs   Glucose 123   99     BUN 12   40     Creatinine 1.26   1.86     Sodium 138   136     Potassium 4.6   5.5     Chloride 99   97     Calcium 9.2   8.8     Albumin  3.3     Total Bilirubin  2.1     Alkaline Phosphatase  104     AST (SGOT)  16     ALT (SGPT)  12     WBC  7.97     Hemoglobin  11.1     Hematocrit  33.5     Platelets  243     Hemoglobin A1C 4.70                   Assessment and Plan   Diagnoses and all orders for this visit:    1. Medicare annual wellness visit, subsequent (Primary)  Assessment & Plan:  ADVICE GIVEN RE:  SEATBELT USE, ALCOHOL USE, HEALTHY DIET, ROUTINE EYE AND DENTAL EXAM, ROUTINE VACCINATIONS.    STATES HE CANNOT AFFORD A SHINGLES SHOT       2. Elevated cholesterol  Assessment & Plan:  Lipid abnormalities are improving with lifestyle modifications.  Nutritional counseling was provided.  Lipids will be reassessed in 6 months.    Orders:  -     Lipid Panel    3. Anxiety  Assessment & Plan:  IMPROVED WITH CURRENT TREATMENT, CONTINUE SAME, WILL REEVALUATE AT NEXT VISIT       4. Essential hypertension  Assessment & Plan:  Hypertension is worsening .  Continue current treatment regimen.  Continue current medications.  Blood pressure will be reassessed at the next regular appointment.  NOT AT GOAL TODAY, HE WILL F/U WITH CARDIOLOGY AS PLANNED    Orders:  -     CBC (No Diff)  -     Comprehensive Metabolic Panel  -     TSH Rfx On Abnormal To Free T4    5. Generalized osteoarthritis  Assessment & Plan:  STABLE ON CURRENT MEDICATION.  CHASITY REVIEWED.  TOX SCREEN IS UP TO DATE.  THE CONTINUED USE OF A CONTROLLED SUBSTANCE IS NECESSARY FOR THIS PATIENT TO HAVE A NEAR NORMAL QUALITY OF LIFE AND WILL BE REVIEWED AT THE NEXT ROUTINE VISIT.      6. Screening for prostate cancer  -      PSA Screen    7. Encounter for screening for other viral diseases  -     Hepatitis C Antibody    8. High risk medication use  -     POC Urine Drug Screen [facility]    9. Urinary hesitancy  Assessment & Plan:  PROBABLE MILD BPH, OBSERVE FOR NOW, DECLINES MEDS AT THIS TIME               Follow Up   Return in about 6 months (around 11/10/2023).  Patient was given instructions and counseling regarding his condition or for health maintenance advice. Please see specific information pulled into the AVS if appropriate.

## 2023-05-12 RX ORDER — PAROXETINE HYDROCHLORIDE 20 MG/1
TABLET, FILM COATED ORAL
Qty: 90 TABLET | Refills: 0 | Status: SHIPPED | OUTPATIENT
Start: 2023-05-12

## 2023-08-07 RX ORDER — PAROXETINE HYDROCHLORIDE 20 MG/1
TABLET, FILM COATED ORAL
Qty: 90 TABLET | Refills: 0 | Status: SHIPPED | OUTPATIENT
Start: 2023-08-07

## 2023-10-24 ENCOUNTER — TELEPHONE (OUTPATIENT)
Dept: FAMILY MEDICINE CLINIC | Age: 73
End: 2023-10-24

## 2023-10-24 NOTE — TELEPHONE ENCOUNTER
Caller: Sharon Polanco    Relationship: Emergency Contact    Best call back number: [phone]     What was the call regarding: SHARON STATES THE PATIENT IS . SHARON STATES SHE WOULD LIKE TO SPEAK TO DR MERLOS ABOUT THE PATIENT REGARDING SOME PERSONAL THINGS. SHARON IS NOT ON THE VERBAL RELEASE FORM.